# Patient Record
Sex: FEMALE | Race: WHITE | NOT HISPANIC OR LATINO
[De-identification: names, ages, dates, MRNs, and addresses within clinical notes are randomized per-mention and may not be internally consistent; named-entity substitution may affect disease eponyms.]

---

## 2017-03-04 ENCOUNTER — HOSPITAL (OUTPATIENT)
Dept: OTHER | Age: 37
End: 2017-03-04
Attending: SURGERY

## 2017-03-04 LAB
AMORPH SED URNS QL MICRO: ABNORMAL
AMPHETAMINES UR QL SCN>500 NG/ML: NEGATIVE
ANALYZER ANC (IANC): NORMAL
ANION GAP SERPL CALC-SCNC: 10 MMOL/L (ref 10–20)
APPEARANCE UR: CLEAR
APTT PPP: 29 SECONDS (ref 22–30)
APTT PPP: NORMAL S
BACTERIA #/AREA URNS HPF: ABNORMAL /HPF
BARBITURATES UR QL SCN>200 NG/ML: NEGATIVE
BASE DEFICIT BLDA-SCNC: 2 MMOL/L (ref 0–2)
BASE EXCESS BLDA CALC-SCNC: NORMAL MMOL/L
BASOPHILS # BLD: 0.1 THOUSAND/MCL (ref 0–0.3)
BASOPHILS NFR BLD: 1 %
BDY SITE: NORMAL
BENZODIAZ UR QL SCN>200 NG/ML: NEGATIVE
BILIRUB UR QL: NEGATIVE
BODY TEMPERATURE: 37 DEGREES
BUN SERPL-MCNC: 10 MG/DL (ref 6–20)
BUN/CREAT SERPL: 13 (ref 7–25)
BZE UR QL SCN>150 NG/ML: NEGATIVE
CA-I BLD ISE-SCNC: 1.23 MMOL/L (ref 1.15–1.29)
CA-I BLDA-SCNC: 18 % (ref 15–23)
CALCIUM SERPL-MCNC: 8.6 MG/DL (ref 8.4–10.2)
CANNABINOIDS UR QL SCN>50 NG/ML: NEGATIVE
CAOX CRY URNS QL MICRO: ABNORMAL
CHLORIDE BLD-SCNC: 110 MMOL/L (ref 98–107)
CHLORIDE: 103 MMOL/L (ref 98–107)
CK SERPL-CCNC: 90 UNIT/L (ref 26–192)
CO2 SERPL-SCNC: 28 MMOL/L (ref 21–32)
COHGB MFR BLD: 0.4 %
COLOR UR: YELLOW
CONDITION: NORMAL
CONDITION: NORMAL
CREAT SERPL-MCNC: 0.8 MG/DL (ref 0.51–0.95)
DIFFERENTIAL METHOD BLD: NORMAL
EOSINOPHIL # BLD: 0.1 THOUSAND/MCL (ref 0.1–0.5)
EOSINOPHIL NFR BLD: 1 %
EPITH CASTS #/AREA URNS LPF: ABNORMAL /[LPF]
ERYTHROCYTE [DISTWIDTH] IN BLOOD: 14.3 % (ref 11–15)
ETHANOL SERPL-MCNC: NORMAL MG/DL
FATTY CASTS #/AREA URNS LPF: ABNORMAL /[LPF]
GLUCOSE BLD-MCNC: 95 MG/DL (ref 65–99)
GLUCOSE SERPL-MCNC: 75 MG/DL (ref 65–99)
GLUCOSE UR-MCNC: NEGATIVE MG/DL
GRAN CASTS #/AREA URNS LPF: ABNORMAL /[LPF]
HCO3 BLDA-SCNC: 22 MMOL/L (ref 22–28)
HEMATOCRIT: 42.1 % (ref 36–46.5)
HGB BLD-MCNC: 13.2 GM/DL (ref 12–15.5)
HGB BLD-MCNC: 13.6 GM/DL (ref 12–15.5)
HGB UR QL: NEGATIVE
HOROWITZ INDEX BLD+IHG-RTO: 21 %
HYALINE CASTS #/AREA URNS LPF: ABNORMAL /[LPF]
INR PPP: 1.1
KETONES UR-MCNC: NEGATIVE MG/DL
LACTATE BLDA-MCNC: 1 MMOL/L
LEUKOCYTE ESTERASE UR QL STRIP: NEGATIVE
LYMPHOCYTES # BLD: 2.5 THOUSAND/MCL (ref 1–4.8)
LYMPHOCYTES NFR BLD: 29 %
MCH RBC QN AUTO: 27.8 PG (ref 26–34)
MCHC RBC AUTO-ENTMCNC: 32.3 GM/DL (ref 32–36.5)
MCV RBC AUTO: 85.9 FL (ref 78–100)
METHADONE UR QL SCN>300 NG/ML: NEGATIVE NG/ML
METHGB MFR BLD: 0.7 %
MIXED CELL CASTS #/AREA URNS LPF: ABNORMAL /[LPF]
MONOCYTES # BLD: 0.6 THOUSAND/MCL (ref 0.3–0.9)
MONOCYTES NFR BLD: 7 %
MUCOUS THREADS URNS QL MICRO: PRESENT
NEUTROPHILS # BLD: 5.3 THOUSAND/MCL (ref 1.8–7.7)
NEUTROPHILS NFR BLD: 62 %
NEUTS SEG NFR BLD: NORMAL %
NITRITE UR QL: NEGATIVE
OPIATES UR QL SCN>300 NG/ML: POSITIVE
OXYHGB MFR BLD: 96.7 % (ref 94–98)
PCO2 BLDA: 32 MM HG (ref 32–45)
PCP UR QL SCN>25 NG/ML: NEGATIVE
PERCENT NRBC: NORMAL
PH BLDA: 7.44 UNIT (ref 7.35–7.45)
PH UR: 6 UNIT (ref 5–7)
PLATELET # BLD: 294 THOUSAND/MCL (ref 140–450)
PO2 BLDA: 98 MM HG (ref 83–108)
POTASSIUM BLD-SCNC: 4 MMOL/L (ref 3.4–5.1)
POTASSIUM SERPL-SCNC: 4.1 MMOL/L (ref 3.4–5.1)
PROT UR QL: NEGATIVE MG/DL
PROTHROMBIN TIME: 12 SECONDS (ref 9.7–11.8)
PROTHROMBIN TIME: ABNORMAL
RBC # BLD: 4.9 MILLION/MCL (ref 4–5.2)
RBC #/AREA URNS HPF: ABNORMAL /HPF (ref 0–3)
RBC CASTS #/AREA URNS LPF: ABNORMAL /[LPF]
RENAL EPI CELLS #/AREA URNS HPF: ABNORMAL /[HPF]
SAO2 % BLDA: 98 % (ref 95–99)
SODIUM BLD-SCNC: 139 MMOL/L (ref 135–145)
SODIUM SERPL-SCNC: 137 MMOL/L (ref 135–145)
SP GR UR: 1.02 (ref 1–1.03)
SPECIMEN SOURCE: ABNORMAL
SPERM URNS QL MICRO: ABNORMAL
SQUAMOUS #/AREA URNS HPF: ABNORMAL /HPF (ref 0–5)
T VAGINALIS URNS QL MICRO: ABNORMAL
TRI-PHOS CRY URNS QL MICRO: ABNORMAL
TROPONIN I SERPL HS-MCNC: <0.02 NG/ML
URATE CRY URNS QL MICRO: ABNORMAL
URINE REFLEX: ABNORMAL
URNS CMNT MICRO: ABNORMAL
UROBILINOGEN UR QL: 0.2 MG/DL (ref 0–1)
WAXY CASTS #/AREA URNS LPF: ABNORMAL /[LPF]
WBC # BLD: 8.6 THOUSAND/MCL (ref 4.2–11)
WBC #/AREA URNS HPF: ABNORMAL /HPF (ref 0–5)
WBC CASTS #/AREA URNS LPF: ABNORMAL /[LPF]
YEAST HYPHAE URNS QL MICRO: ABNORMAL
YEAST URNS QL MICRO: ABNORMAL

## 2017-03-05 LAB
ANALYZER ANC (IANC): ABNORMAL
ANION GAP SERPL CALC-SCNC: 12 MMOL/L (ref 10–20)
BASOPHILS # BLD: 0.1 THOUSAND/MCL (ref 0–0.3)
BASOPHILS NFR BLD: 1 %
BUN SERPL-MCNC: 13 MG/DL (ref 6–20)
BUN/CREAT SERPL: 17 (ref 7–25)
CALCIUM SERPL-MCNC: 7.7 MG/DL (ref 8.4–10.2)
CHLORIDE: 107 MMOL/L (ref 98–107)
CO2 SERPL-SCNC: 25 MMOL/L (ref 21–32)
CREAT SERPL-MCNC: 0.76 MG/DL (ref 0.51–0.95)
DIFFERENTIAL METHOD BLD: ABNORMAL
EOSINOPHIL # BLD: 0.2 THOUSAND/MCL (ref 0.1–0.5)
EOSINOPHIL NFR BLD: 3 %
ERYTHROCYTE [DISTWIDTH] IN BLOOD: 14.6 % (ref 11–15)
GLUCOSE SERPL-MCNC: 88 MG/DL (ref 65–99)
HEMATOCRIT: 36.4 % (ref 36–46.5)
HGB BLD-MCNC: 11.6 GM/DL (ref 12–15.5)
LYMPHOCYTES # BLD: 2.9 THOUSAND/MCL (ref 1–4.8)
LYMPHOCYTES NFR BLD: 39 %
MCH RBC QN AUTO: 27.8 PG (ref 26–34)
MCHC RBC AUTO-ENTMCNC: 31.9 GM/DL (ref 32–36.5)
MCV RBC AUTO: 87.1 FL (ref 78–100)
MONOCYTES # BLD: 0.4 THOUSAND/MCL (ref 0.3–0.9)
MONOCYTES NFR BLD: 5 %
NEUTROPHILS # BLD: 3.8 THOUSAND/MCL (ref 1.8–7.7)
NEUTROPHILS NFR BLD: 52 %
NEUTS SEG NFR BLD: ABNORMAL %
PERCENT NRBC: ABNORMAL
PLATELET # BLD: 255 THOUSAND/MCL (ref 140–450)
POTASSIUM SERPL-SCNC: 4 MMOL/L (ref 3.4–5.1)
RBC # BLD: 4.18 MILLION/MCL (ref 4–5.2)
SODIUM SERPL-SCNC: 140 MMOL/L (ref 135–145)
WBC # BLD: 7.3 THOUSAND/MCL (ref 4.2–11)

## 2017-03-13 ENCOUNTER — HOSPITAL (OUTPATIENT)
Dept: OTHER | Age: 37
End: 2017-03-13
Attending: SURGERY

## 2019-04-14 ENCOUNTER — HOSPITAL (OUTPATIENT)
Dept: OTHER | Age: 39
End: 2019-04-14
Attending: EMERGENCY MEDICINE

## 2019-04-14 LAB — HCG POINT OF CARE (5HGRST): NEGATIVE

## 2020-12-18 ENCOUNTER — VIRTUAL VISIT (OUTPATIENT)
Dept: FAMILY MEDICINE CLINIC | Age: 40
End: 2020-12-18

## 2020-12-18 RX ORDER — ALBUTEROL SULFATE 90 UG/1
2 AEROSOL, METERED RESPIRATORY (INHALATION)
COMMUNITY

## 2020-12-18 RX ORDER — OLOPATADINE HYDROCHLORIDE 2 MG/ML
SOLUTION/ DROPS OPHTHALMIC
COMMUNITY
Start: 2020-12-01

## 2020-12-18 RX ORDER — METHYLPHENIDATE HYDROCHLORIDE 27 MG/1
18 TABLET, EXTENDED RELEASE ORAL DAILY
COMMUNITY
Start: 2020-11-21

## 2020-12-18 RX ORDER — METOCLOPRAMIDE 5 MG/1
5 TABLET ORAL
COMMUNITY
End: 2021-01-11 | Stop reason: SDUPTHER

## 2020-12-18 RX ORDER — TRAZODONE HYDROCHLORIDE 50 MG/1
TABLET ORAL
COMMUNITY

## 2020-12-18 RX ORDER — CYCLOPENTOLATE HYDROCHLORIDE 10 MG/ML
1 SOLUTION/ DROPS OPHTHALMIC
COMMUNITY

## 2020-12-18 RX ORDER — LURASIDONE HYDROCHLORIDE 60 MG/1
80 TABLET, FILM COATED ORAL
COMMUNITY
Start: 2020-12-16

## 2020-12-18 RX ORDER — GABAPENTIN 300 MG/1
800 CAPSULE ORAL 2 TIMES DAILY
COMMUNITY
Start: 2020-10-24

## 2020-12-18 RX ORDER — IPRATROPIUM BROMIDE 42 UG/1
2 SPRAY, METERED NASAL 4 TIMES DAILY
COMMUNITY

## 2020-12-18 RX ORDER — AMITRIPTYLINE HYDROCHLORIDE 50 MG/1
75 TABLET, FILM COATED ORAL
COMMUNITY
Start: 2020-10-24

## 2020-12-18 RX ORDER — PANTOPRAZOLE SODIUM 40 MG/1
TABLET, DELAYED RELEASE ORAL
COMMUNITY
Start: 2020-12-08 | End: 2021-01-07 | Stop reason: SDUPTHER

## 2020-12-18 RX ORDER — MONTELUKAST SODIUM 10 MG/1
10 TABLET ORAL AT BEDTIME
COMMUNITY

## 2020-12-18 RX ORDER — UMECLIDINIUM 62.5 UG/1
AEROSOL, POWDER ORAL
COMMUNITY
Start: 2020-12-07

## 2020-12-18 RX ORDER — NORETHINDRONE AND ETHINYL ESTRADIOL 1 MG-35MCG
KIT ORAL
COMMUNITY
Start: 2020-12-17

## 2020-12-18 RX ORDER — OLOPATADINE HYDROCHLORIDE 665 UG/1
SPRAY NASAL 2 TIMES DAILY
COMMUNITY

## 2020-12-18 NOTE — PROGRESS NOTES
1. Have you been to the ER, urgent care clinic since your last visit? Hospitalized since your last visit? No    2. Have you seen or consulted any other health care providers outside of the [address] [place] [address] since your last visit? Include any pap smears or colon screening. No       Pharmacy verified. [id number] [address] [facility], [place] [facility] [address] [facility]    Last PCP: Pauly Lott. MD Mark  Phone: [phone]    Patient states does not weight herself due to eating Disorder.     3 most recent PHQ Screens 12/18/2020   Little interest or pleasure in doing things Several days   Feeling down, depressed, irritable, or hopeless Several days   Total Score PHQ 2 2     Health Maintenance Due   Topic Date Due    DTaP/Tdap/Td series (1 - Tdap) 05/05/2000    PAP AKA CERVICAL CYTOLOGY  05/05/2000    Lipid Screen  05/05/2019    Flu Vaccine (1) 09/01/2020     Chief Complaint   Patient presents with   Conception Stair Patient   [address] [facility]

## 2020-12-22 ENCOUNTER — VIRTUAL VISIT (OUTPATIENT)
Dept: FAMILY MEDICINE CLINIC | Age: 40
End: 2020-12-22
Payer: COMMERCIAL

## 2020-12-22 DIAGNOSIS — R79.89 ABNORMAL TSH: ICD-10-CM

## 2020-12-22 DIAGNOSIS — R79.89 ELEVATED FERRITIN: Primary | ICD-10-CM

## 2020-12-22 DIAGNOSIS — K31.84 GASTROPARESIS: ICD-10-CM

## 2020-12-22 DIAGNOSIS — D50.8 OTHER IRON DEFICIENCY ANEMIA: ICD-10-CM

## 2020-12-22 DIAGNOSIS — H55.00 NYSTAGMUS: ICD-10-CM

## 2020-12-22 DIAGNOSIS — R94.5 ABNORMAL LIVER FUNCTION: ICD-10-CM

## 2020-12-22 DIAGNOSIS — R79.89 ELEVATED FERRITIN: ICD-10-CM

## 2020-12-22 DIAGNOSIS — R53.83 LETHARGY: Primary | ICD-10-CM

## 2020-12-22 DIAGNOSIS — J45.40 MODERATE PERSISTENT ASTHMA, UNSPECIFIED WHETHER COMPLICATED: ICD-10-CM

## 2020-12-22 PROCEDURE — 99203 OFFICE O/P NEW LOW 30 MIN: CPT | Performed by: FAMILY MEDICINE

## 2020-12-22 NOTE — PROGRESS NOTES
1. Have you been to the ER, urgent care clinic since your last visit? Hospitalized since your last visit? No    2. Have you seen or consulted any other health care providers outside of the [address] [place] [address] since your last visit? Include any pap smears or colon screening. No      Patient states does not want to be asked her weight due to eating disorder. Labs sent via mail to address on file. Chief Complaint   Patient presents with    New Patient    Establish Care    Labs     3 most recent PHQ Screens 12/22/2020   Little interest or pleasure in doing things Several days   Feeling down, depressed, irritable, or hopeless Several days   Total Score PHQ 2 2     Health Maintenance Due   Topic Date Due    DTaP/Tdap/Td series (1 - Tdap) 05/05/2000    PAP AKA CERVICAL CYTOLOGY  05/05/2000    Lipid Screen  05/05/2019    Flu Vaccine (1) 09/01/2020       Patient-Reported Vitals 12/22/2020   Patient-Reported Weight Patient states does not want to be asked her weight due to eating disorder.

## 2020-12-22 NOTE — PROGRESS NOTES
Abdulaziz Caal is a 38 y.o. female who was seen by synchronous (real-time) audio-video technology on 12/22/2020 for New Patient, Establish Care, and Labs    She reently moved here from [facility] [address]  She was very anemic last year and required transfusions in the past  She is feeling the same symptoms  Fatigue, having pica and wants to check again  She saw hematology but no one could figure out why    She has an h/o anorexia and radha eating disorder  She sees a counselor and dietitian     Assessment & Plan:   Diagnoses and all orders for this visit:    1. Lethargy  -     VITAMIN B12 & FOLATE; Future  -     TSH 3RD GENERATION; Future  -     IRON PROFILE; Future  -     CBC WITH AUTOMATED DIFF; Future  -     METABOLIC PANEL, COMPREHENSIVE; Future  -     FERRITIN; Future    2. Other iron deficiency anemia  -     VITAMIN B12 & FOLATE; Future  -     IRON PROFILE; Future  -     CBC WITH AUTOMATED DIFF; Future  -     METABOLIC PANEL, COMPREHENSIVE; Future  -     FERRITIN; Future    3. Nystagmus  -     REFERRAL TO OPHTHALMOLOGY    4. Gastroparesis  -     REFERRAL TO GASTROENTEROLOGY    5. Moderate persistent asthma, unspecified whether complicated  -     REFERRAL TO ALLERGY            Subjective:       Prior to Admission medications    Medication Sig Start Date End Date Taking? Authorizing Provider   Latuda 60 mg tab tablet  12/16/20  Yes Provider, Historical   Concerta 27 mg CR tablet  11/21/20  Yes Provider, Historical   amitriptyline (ELAVIL) 50 mg tablet  10/24/20  Yes Provider, Historical   gabapentin (NEURONTIN) 300 mg capsule Patient states taking 900 mg daily 10/24/20  Yes Provider, Historical   montelukast (SINGULAIR) 10 mg tablet Take 10 mg by mouth At bedtime. Yes Provider, Historical   metoclopramide HCl (Reglan) 5 mg tablet Take 5 mg by mouth Before breakfast, lunch, and dinner.    Yes Provider, Historical   pantoprazole (PROTONIX) 40 mg tablet  12/8/20  Yes Provider, Historical   albuterol (PROVENTIL HFA, VENTOLIN HFA, PROAIR HFA) 90 mcg/actuation inhaler Take 2 Puffs by inhalation every six (6) hours as needed. Yes Provider, Historical   Incruse Ellipta 62.5 mcg/actuation inhaler  12/7/20  Yes Provider, Historical   olopatadine (PATADAY) 0.2 % drop ophthalmic solution  12/1/20  Yes Provider, Historical   cyclopentolate (CYCLOGYL) 1 % ophthalmic solution Administer 1 Drop to both eyes now. Yes Provider, Historical   ipratropium (ATROVENT) 42 mcg (0.06 %) nasal spray 2 Sprays four (4) times daily. Yes Provider, Historical   Alyacen 1/35, 28, 1-35 mg-mcg tab  12/17/20  Yes Provider, Historical   traZODone (DESYREL) 50 mg tablet Take  by mouth nightly. Yes Provider, Historical   olopatadine (PATANASE) 0.6 % spry two (2) times a day. Yes Provider, Historical     There are no active problems to display for this patient. Current Outpatient Medications   Medication Sig Dispense Refill    Latuda 60 mg tab tablet       Concerta 27 mg CR tablet       amitriptyline (ELAVIL) 50 mg tablet       gabapentin (NEURONTIN) 300 mg capsule Patient states taking 900 mg daily      montelukast (SINGULAIR) 10 mg tablet Take 10 mg by mouth At bedtime.  metoclopramide HCl (Reglan) 5 mg tablet Take 5 mg by mouth Before breakfast, lunch, and dinner.  pantoprazole (PROTONIX) 40 mg tablet       albuterol (PROVENTIL HFA, VENTOLIN HFA, PROAIR HFA) 90 mcg/actuation inhaler Take 2 Puffs by inhalation every six (6) hours as needed.  Incruse Ellipta 62.5 mcg/actuation inhaler       olopatadine (PATADAY) 0.2 % drop ophthalmic solution       cyclopentolate (CYCLOGYL) 1 % ophthalmic solution Administer 1 Drop to both eyes now.  ipratropium (ATROVENT) 42 mcg (0.06 %) nasal spray 2 Sprays four (4) times daily.  Alyacen 1/35, 28, 1-35 mg-mcg tab       traZODone (DESYREL) 50 mg tablet Take  by mouth nightly.  olopatadine (PATANASE) 0.6 % spry two (2) times a day.          ROS    Objective:     Patient-Reported Vitals 12/22/2020   Patient-Reported Weight Patient states does not want to be asked her weight due to eating disorder. [INSTRUCTIONS:  \"[x]\" Indicates a positive item  \"[]\" Indicates a negative item  -- DELETE ALL ITEMS NOT EXAMINED]    Constitutional: [x] Appears well-developed and well-nourished [x] No apparent distress      [] Abnormal -     Mental status: [x] Alert and awake  [x] Oriented to person/place/time [x] Able to follow commands    [] Abnormal -     Eyes:   EOM    [x]  Normal    [] Abnormal -   Sclera  [x]  Normal    [] Abnormal -          Discharge [x]  None visible   [] Abnormal -     HENT: [x] Normocephalic, atraumatic  [] Abnormal -   [x] Mouth/Throat: Mucous membranes are moist    External Ears [x] Normal  [] Abnormal -    Neck: [x] No visualized mass [] Abnormal -     Pulmonary/Chest: [x] Respiratory effort normal   [x] No visualized signs of difficulty breathing or respiratory distress        [] Abnormal -      Musculoskeletal:   [x] Normal gait with no signs of ataxia         [x] Normal range of motion of neck        [] Abnormal -     Neurological:        [x] No Facial Asymmetry (Cranial nerve 7 motor function) (limited exam due to video visit)          [x] No gaze palsy        [] Abnormal -          Skin:        [x] No significant exanthematous lesions or discoloration noted on facial skin         [] Abnormal -            Psychiatric:       [x] Normal Affect [] Abnormal -        [x] No Hallucinations    Other pertinent observable physical exam findings:-        We discussed the expected course, resolution and complications of the diagnosis(es) in detail. Medication risks, benefits, costs, interactions, and alternatives were discussed as indicated. I advised her to contact the office if her condition worsens, changes or fails to improve as anticipated. She expressed understanding with the diagnosis(es) and plan.        Rohit Jones, who was evaluated through a patient-initiated, synchronous (real-time) audio-video encounter, and/or her healthcare decision maker, is aware that it is a billable service, with coverage as determined by her insurance carrier. She provided verbal consent to proceed: Yes, and patient identification was verified. It was conducted pursuant to the emergency declaration under the [address] [place] [address] authority and the [employer] General Act. A caregiver was present when appropriate. Ability to conduct physical exam was limited. I was at home. The patient was at home.       Jenny Dwyer MD

## 2020-12-28 LAB
ALBUMIN SERPL-MCNC: 3.6 G/DL (ref 3.5–5)
ALBUMIN/GLOB SERPL: 1 {RATIO} (ref 1.1–2.2)
ALP SERPL-CCNC: 116 U/L (ref 45–117)
ALT SERPL-CCNC: 74 U/L (ref 12–78)
ANION GAP SERPL CALC-SCNC: 8 MMOL/L (ref 5–15)
AST SERPL-CCNC: 44 U/L (ref 15–37)
BASOPHILS # BLD: 0.1 K/UL (ref 0–0.1)
BASOPHILS NFR BLD: 1 % (ref 0–1)
BILIRUB SERPL-MCNC: 0.3 MG/DL (ref 0.2–1)
BUN SERPL-MCNC: 10 MG/DL (ref 6–20)
BUN/CREAT SERPL: 10 (ref 12–20)
CALCIUM SERPL-MCNC: 9.2 MG/DL (ref 8.5–10.1)
CHLORIDE SERPL-SCNC: 107 MMOL/L (ref 97–108)
CO2 SERPL-SCNC: 22 MMOL/L (ref 21–32)
CREAT SERPL-MCNC: 1.03 MG/DL (ref 0.55–1.02)
DIFFERENTIAL METHOD BLD: NORMAL
EOSINOPHIL # BLD: 0.2 K/UL (ref 0–0.4)
EOSINOPHIL NFR BLD: 2 % (ref 0–7)
ERYTHROCYTE [DISTWIDTH] IN BLOOD BY AUTOMATED COUNT: 13.3 % (ref 11.5–14.5)
FERRITIN SERPL-MCNC: 716 NG/ML (ref 26–388)
FOLATE SERPL-MCNC: 22.1 NG/ML (ref 5–21)
GLOBULIN SER CALC-MCNC: 3.5 G/DL (ref 2–4)
GLUCOSE SERPL-MCNC: 101 MG/DL (ref 65–100)
HCT VFR BLD AUTO: 42.1 % (ref 35–47)
HGB BLD-MCNC: 13.7 G/DL (ref 11.5–16)
IMM GRANULOCYTES # BLD AUTO: 0 K/UL (ref 0–0.04)
IMM GRANULOCYTES NFR BLD AUTO: 0 % (ref 0–0.5)
IRON SATN MFR SERPL: 23 % (ref 20–50)
IRON SERPL-MCNC: 74 UG/DL (ref 35–150)
LYMPHOCYTES # BLD: 1.9 K/UL (ref 0.8–3.5)
LYMPHOCYTES NFR BLD: 27 % (ref 12–49)
MCH RBC QN AUTO: 28.2 PG (ref 26–34)
MCHC RBC AUTO-ENTMCNC: 32.5 G/DL (ref 30–36.5)
MCV RBC AUTO: 86.6 FL (ref 80–99)
MONOCYTES # BLD: 0.3 K/UL (ref 0–1)
MONOCYTES NFR BLD: 5 % (ref 5–13)
NEUTS SEG # BLD: 4.7 K/UL (ref 1.8–8)
NEUTS SEG NFR BLD: 65 % (ref 32–75)
NRBC # BLD: 0 K/UL (ref 0–0.01)
NRBC BLD-RTO: 0 PER 100 WBC
PLATELET # BLD AUTO: 302 K/UL (ref 150–400)
PMV BLD AUTO: 11 FL (ref 8.9–12.9)
POTASSIUM SERPL-SCNC: 4.2 MMOL/L (ref 3.5–5.1)
PROT SERPL-MCNC: 7.1 G/DL (ref 6.4–8.2)
RBC # BLD AUTO: 4.86 M/UL (ref 3.8–5.2)
SODIUM SERPL-SCNC: 137 MMOL/L (ref 136–145)
TIBC SERPL-MCNC: 322 UG/DL (ref 250–450)
TSH SERPL DL<=0.05 MIU/L-ACNC: 4.59 UIU/ML (ref 0.36–3.74)
VIT B12 SERPL-MCNC: 458 PG/ML (ref 193–986)
WBC # BLD AUTO: 7.2 K/UL (ref 3.6–11)

## 2021-01-02 NOTE — PROGRESS NOTES
The thyroid test shows you have low thyroid hormone. Your lethargy is likely coming from hypothyroidism  I want to do some additional tests to confirm this  The blood test showed you have excess iron storage so do not take any iron supplements, I want to repeat that too  I will order the repeat tests.  After you get the blood drawn, I want you to make a virtual visit appointment to go over all of this

## 2021-01-04 ENCOUNTER — TELEPHONE (OUTPATIENT)
Dept: FAMILY MEDICINE CLINIC | Age: 41
End: 2021-01-04

## 2021-01-04 DIAGNOSIS — R79.89 ELEVATED FERRITIN: ICD-10-CM

## 2021-01-04 DIAGNOSIS — R79.89 ABNORMAL TSH: ICD-10-CM

## 2021-01-04 DIAGNOSIS — R94.5 ABNORMAL LIVER FUNCTION: ICD-10-CM

## 2021-01-05 ENCOUNTER — PATIENT MESSAGE (OUTPATIENT)
Dept: FAMILY MEDICINE CLINIC | Age: 41
End: 2021-01-05

## 2021-01-07 RX ORDER — PANTOPRAZOLE SODIUM 40 MG/1
40 TABLET, DELAYED RELEASE ORAL DAILY
Qty: 30 TAB | Refills: 0 | Status: SHIPPED | OUTPATIENT
Start: 2021-01-07 | End: 2021-08-20 | Stop reason: SDUPTHER

## 2021-01-08 NOTE — TELEPHONE ENCOUNTER
From: Bruna Rust  To: Brandi Yoon MD  Sent: 1/5/2021 5:59 PM EST  Subject: Prescription Question    Dr. Darrold Angelucci,  I need a refill on one of my gastro meds. I am scheduled to see Dr. Shanell Banerjee with [facility] but I couldn't get in to see him for a few weeks. In the meantime I am almost out of my Reglan (5mg), which I take for gastro-paresis.   Would you be able to fill this one time before I get connected with my new gastroenterologist?    My [facility] pharmacy is:  [address] [place] [address]   Ph: [phone]      Thanks,  Jim Yanez

## 2021-01-08 NOTE — TELEPHONE ENCOUNTER
reglan is contraindicated with latuda  I did not refill.  I defer to the GI specialist  I did refill the protonix

## 2021-01-11 ENCOUNTER — VIRTUAL VISIT (OUTPATIENT)
Dept: FAMILY MEDICINE CLINIC | Age: 41
End: 2021-01-11
Payer: COMMERCIAL

## 2021-01-11 DIAGNOSIS — K31.84 GASTROPARESIS: Primary | ICD-10-CM

## 2021-01-11 PROCEDURE — 99213 OFFICE O/P EST LOW 20 MIN: CPT | Performed by: FAMILY MEDICINE

## 2021-01-11 RX ORDER — METOCLOPRAMIDE 5 MG/1
5 TABLET ORAL
Qty: 90 TAB | Refills: 0 | Status: SHIPPED | OUTPATIENT
Start: 2021-01-11

## 2021-01-11 NOTE — PROGRESS NOTES
Barbara Zavala is a 38 y.o. female who was seen by synchronous (real-time) audio-video technology on 1/11/2021 for GERD  she had a very low ferritin a year ago   her recent ferritin was high   she eats a lot of red meat  She has not taken iron in a long time  The latuda and reglan have possible neg interaction she has leah taking it for a year and no neg side effects   she has constant reflux without reglan and can't eat  She has a past eating disorder  She will talk to GI about making a change      Assessment & Plan:   Diagnoses and all orders for this visit:    1. Gastroparesis  -     metoclopramide HCl (Reglan) 5 mg tablet; Take 1 Tab by mouth Before breakfast, lunch, and dinner. Subjective:       Prior to Admission medications    Medication Sig Start Date End Date Taking? Authorizing Provider   metoclopramide HCl (Reglan) 5 mg tablet Take 1 Tab by mouth Before breakfast, lunch, and dinner. 1/11/21  Yes Sommer Venegas MD   pantoprazole (PROTONIX) 40 mg tablet Take 1 Tab by mouth daily. 1/7/21   Sommer Venegas MD   Latuda 60 mg tab tablet  12/16/20   Provider, Historical   Concerta 27 mg CR tablet  11/21/20   Provider, Historical   amitriptyline (ELAVIL) 50 mg tablet  10/24/20   Provider, Historical   gabapentin (NEURONTIN) 300 mg capsule Patient states taking 900 mg daily 10/24/20   Provider, Historical   montelukast (SINGULAIR) 10 mg tablet Take 10 mg by mouth At bedtime. Provider, Historical   albuterol (PROVENTIL HFA, VENTOLIN HFA, PROAIR HFA) 90 mcg/actuation inhaler Take 2 Puffs by inhalation every six (6) hours as needed. Provider, Historical   Incruse Ellipta 62.5 mcg/actuation inhaler  12/7/20   Provider, Historical   olopatadine (PATADAY) 0.2 % drop ophthalmic solution  12/1/20   Provider, Historical   cyclopentolate (CYCLOGYL) 1 % ophthalmic solution Administer 1 Drop to both eyes now.     Provider, Historical   ipratropium (ATROVENT) 42 mcg (0.06 %) nasal spray 2 Sprays four (4) times daily.    Provider, Historical   Alyacen 1/35, 28, 1-35 mg-mcg tab  12/17/20   Provider, Historical   traZODone (DESYREL) 50 mg tablet Take  by mouth nightly. Provider, Historical   olopatadine (PATANASE) 0.6 % spry two (2) times a day. Provider, Historical     There are no active problems to display for this patient. Current Outpatient Medications   Medication Sig Dispense Refill    metoclopramide HCl (Reglan) 5 mg tablet Take 1 Tab by mouth Before breakfast, lunch, and dinner. 90 Tab 0    pantoprazole (PROTONIX) 40 mg tablet Take 1 Tab by mouth daily. 30 Tab 0    Latuda 60 mg tab tablet       Concerta 27 mg CR tablet       amitriptyline (ELAVIL) 50 mg tablet       gabapentin (NEURONTIN) 300 mg capsule Patient states taking 900 mg daily      montelukast (SINGULAIR) 10 mg tablet Take 10 mg by mouth At bedtime.  albuterol (PROVENTIL HFA, VENTOLIN HFA, PROAIR HFA) 90 mcg/actuation inhaler Take 2 Puffs by inhalation every six (6) hours as needed.  Incruse Ellipta 62.5 mcg/actuation inhaler       olopatadine (PATADAY) 0.2 % drop ophthalmic solution       cyclopentolate (CYCLOGYL) 1 % ophthalmic solution Administer 1 Drop to both eyes now.  ipratropium (ATROVENT) 42 mcg (0.06 %) nasal spray 2 Sprays four (4) times daily.  Alyacen 1/35, 28, 1-35 mg-mcg tab       traZODone (DESYREL) 50 mg tablet Take  by mouth nightly.  olopatadine (PATANASE) 0.6 % spry two (2) times a day. ROS    Objective:     Patient-Reported Vitals 12/22/2020   Patient-Reported Weight Patient states does not want to be asked her weight due to eating disorder.         [INSTRUCTIONS:  \"[x]\" Indicates a positive item  \"[]\" Indicates a negative item  -- DELETE ALL ITEMS NOT EXAMINED]    Constitutional: [x] Appears well-developed and well-nourished [x] No apparent distress      [] Abnormal -     Mental status: [x] Alert and awake  [x] Oriented to person/place/time [x] Able to follow commands    [] Abnormal -     Eyes:   EOM    [x]  Normal    [] Abnormal -   Sclera  [x]  Normal    [] Abnormal -          Discharge [x]  None visible   [] Abnormal -     HENT: [x] Normocephalic, atraumatic  [] Abnormal -   [x] Mouth/Throat: Mucous membranes are moist    External Ears [x] Normal  [] Abnormal -    Neck: [x] No visualized mass [] Abnormal -     Pulmonary/Chest: [x] Respiratory effort normal   [x] No visualized signs of difficulty breathing or respiratory distress        [] Abnormal -      Musculoskeletal:   [x] Normal gait with no signs of ataxia         [x] Normal range of motion of neck        [] Abnormal -     Neurological:        [x] No Facial Asymmetry (Cranial nerve 7 motor function) (limited exam due to video visit)          [x] No gaze palsy        [] Abnormal -          Skin:        [x] No significant exanthematous lesions or discoloration noted on facial skin         [] Abnormal -            Psychiatric:       [x] Normal Affect [] Abnormal -        [x] No Hallucinations    Other pertinent observable physical exam findings:-        We discussed the expected course, resolution and complications of the diagnosis(es) in detail. Medication risks, benefits, costs, interactions, and alternatives were discussed as indicated. I advised her to contact the office if her condition worsens, changes or fails to improve as anticipated. She expressed understanding with the diagnosis(es) and plan. Arpita Stern, who was evaluated through a patient-initiated, synchronous (real-time) audio-video encounter, and/or her healthcare decision maker, is aware that it is a billable service, with coverage as determined by her insurance carrier. She provided verbal consent to proceed: Yes, and patient identification was verified.  It was conducted pursuant to the emergency declaration under the [facility] [address] authority and the [employer] and [facility] Act. A caregiver was present when appropriate. Ability to conduct physical exam was limited. I was at home. The patient was at home.       Yany Cool MD

## 2021-02-24 DIAGNOSIS — R53.83 LETHARGY: Primary | ICD-10-CM

## 2021-02-24 DIAGNOSIS — R79.89 ELEVATED FERRITIN: ICD-10-CM

## 2021-02-25 ENCOUNTER — VIRTUAL VISIT (OUTPATIENT)
Dept: FAMILY MEDICINE CLINIC | Age: 41
End: 2021-02-25
Payer: COMMERCIAL

## 2021-02-25 DIAGNOSIS — R79.89 ELEVATED FERRITIN: Primary | ICD-10-CM

## 2021-02-25 PROCEDURE — 99213 OFFICE O/P EST LOW 20 MIN: CPT | Performed by: FAMILY MEDICINE

## 2021-02-25 NOTE — PROGRESS NOTES
1. Have you been to the ER, urgent care clinic since your last visit? Hospitalized since your last visit? No    2. Have you seen or consulted any other health care providers outside of the [address] [place] [address] since your last visit? Include any pap smears or colon screening. No     Chief Complaint   Patient presents with    Results     Health Maintenance Due   Topic Date Due    Hepatitis C Screening  1980    DTaP/Tdap/Td series (1 - Tdap) 05/05/2000    PAP AKA CERVICAL CYTOLOGY  05/05/2000    Lipid Screen  05/05/2019    Flu Vaccine (1) 09/01/2020     3 most recent PHQ Screens 2/25/2021   Little interest or pleasure in doing things Several days   Feeling down, depressed, irritable, or hopeless Several days   Total Score PHQ 2 2     Patient refuse weight due to eating Disorder.

## 2021-02-25 NOTE — PROGRESS NOTES
Alexander Gonzales is a 38 y.o. female who was seen by synchronous (real-time) audio-video technology on 2/25/2021 for Results  her ferritin is higher now than it was last month  She has not had a virus recently, she tested neg a few days ago for covid and she had the Antibody  test and that was also neg. She has c/o fatigue and feels like she felt in the past when she was anemic  Her hgb is normal now         Assessment & Plan:   Diagnoses and all orders for this visit:    1. Elevated ferritin    I am referring her to hematology  I gave her the inforation about the referral and that I will be following along and will help her understand things as we sort this out    Recheck in 3 months to see what progress has been made and make referral as needed       Subjective:       Prior to Admission medications    Medication Sig Start Date End Date Taking? Authorizing Provider   metoclopramide HCl (Reglan) 5 mg tablet Take 1 Tab by mouth Before breakfast, lunch, and dinner. 1/11/21  Yes Carlos Juarez MD   pantoprazole (PROTONIX) 40 mg tablet Take 1 Tab by mouth daily. 1/7/21  Yes Carlos Juarez MD   Latuda 60 mg tab tablet 80 mg. 12/16/20  Yes Provider, Historical   Concerta 27 mg CR tablet  11/21/20  Yes Provider, Historical   amitriptyline (ELAVIL) 50 mg tablet  10/24/20  Yes Provider, Historical   gabapentin (NEURONTIN) 300 mg capsule Patient states taking 900 mg daily 10/24/20  Yes Provider, Historical   montelukast (SINGULAIR) 10 mg tablet Take 10 mg by mouth At bedtime. Yes Provider, Historical   albuterol (PROVENTIL HFA, VENTOLIN HFA, PROAIR HFA) 90 mcg/actuation inhaler Take 2 Puffs by inhalation every six (6) hours as needed. Yes Provider, Historical   Incruse Ellipta 62.5 mcg/actuation inhaler  12/7/20  Yes Provider, Historical   olopatadine (PATADAY) 0.2 % drop ophthalmic solution  12/1/20  Yes Provider, Historical   cyclopentolate (CYCLOGYL) 1 % ophthalmic solution Administer 1 Drop to both eyes now.    Yes Provider, Historical   ipratropium (ATROVENT) 42 mcg (0.06 %) nasal spray 2 Sprays four (4) times daily. Yes Provider, Historical   Alyacen 1/35, 28, 1-35 mg-mcg tab  12/17/20  Yes Provider, Historical   traZODone (DESYREL) 50 mg tablet Take  by mouth nightly. Yes Provider, Historical   olopatadine (PATANASE) 0.6 % spry two (2) times a day. Yes Provider, Historical     There are no active problems to display for this patient. Current Outpatient Medications   Medication Sig Dispense Refill    metoclopramide HCl (Reglan) 5 mg tablet Take 1 Tab by mouth Before breakfast, lunch, and dinner. 90 Tab 0    pantoprazole (PROTONIX) 40 mg tablet Take 1 Tab by mouth daily. 30 Tab 0    Latuda 60 mg tab tablet 80 mg.      Concerta 27 mg CR tablet       amitriptyline (ELAVIL) 50 mg tablet       gabapentin (NEURONTIN) 300 mg capsule Patient states taking 900 mg daily      montelukast (SINGULAIR) 10 mg tablet Take 10 mg by mouth At bedtime.  albuterol (PROVENTIL HFA, VENTOLIN HFA, PROAIR HFA) 90 mcg/actuation inhaler Take 2 Puffs by inhalation every six (6) hours as needed.  Incruse Ellipta 62.5 mcg/actuation inhaler       olopatadine (PATADAY) 0.2 % drop ophthalmic solution       cyclopentolate (CYCLOGYL) 1 % ophthalmic solution Administer 1 Drop to both eyes now.  ipratropium (ATROVENT) 42 mcg (0.06 %) nasal spray 2 Sprays four (4) times daily.  Alyacen 1/35, 28, 1-35 mg-mcg tab       traZODone (DESYREL) 50 mg tablet Take  by mouth nightly.  olopatadine (PATANASE) 0.6 % spry two (2) times a day. ROS    Objective:     Patient-Reported Vitals 12/22/2020   Patient-Reported Weight Patient states does not want to be asked her weight due to eating disorder.         [INSTRUCTIONS:  \"[x]\" Indicates a positive item  \"[]\" Indicates a negative item  -- DELETE ALL ITEMS NOT EXAMINED]    Constitutional: [x] Appears well-developed and well-nourished [x] No apparent distress      [] Abnormal - Mental status: [x] Alert and awake  [x] Oriented to person/place/time [x] Able to follow commands    [] Abnormal -     Eyes:   EOM    [x]  Normal    [] Abnormal -   Sclera  [x]  Normal    [] Abnormal -          Discharge [x]  None visible   [] Abnormal -     HENT: [x] Normocephalic, atraumatic  [] Abnormal -   [x] Mouth/Throat: Mucous membranes are moist    External Ears [x] Normal  [] Abnormal -    Neck: [x] No visualized mass [] Abnormal -     Pulmonary/Chest: [x] Respiratory effort normal   [x] No visualized signs of difficulty breathing or respiratory distress        [] Abnormal -      Musculoskeletal:   [x] Normal gait with no signs of ataxia         [x] Normal range of motion of neck        [] Abnormal -     Neurological:        [x] No Facial Asymmetry (Cranial nerve 7 motor function) (limited exam due to video visit)          [x] No gaze palsy        [] Abnormal -          Skin:        [x] No significant exanthematous lesions or discoloration noted on facial skin         [] Abnormal -            Psychiatric:       [x] Normal Affect [] Abnormal -        [x] No Hallucinations    Other pertinent observable physical exam findings:-        We discussed the expected course, resolution and complications of the diagnosis(es) in detail. Medication risks, benefits, costs, interactions, and alternatives were discussed as indicated. I advised her to contact the office if her condition worsens, changes or fails to improve as anticipated. She expressed understanding with the diagnosis(es) and plan. Elisabet Levin, who was evaluated through a patient-initiated, synchronous (real-time) audio-video encounter, and/or her healthcare decision maker, is aware that it is a billable service, with coverage as determined by her insurance carrier. She provided verbal consent to proceed: Yes, and patient identification was verified.  It was conducted pursuant to the emergency declaration under the [address] and the National Emergencies Act, [facility] waiver authority and the [employer] and [employer] Act. A caregiver was present when appropriate. Ability to conduct physical exam was limited. I was at home. The patient was at home.       Diallo Dozier MD

## 2021-03-18 ENCOUNTER — TELEPHONE (OUTPATIENT)
Dept: FAMILY MEDICINE CLINIC | Age: 41
End: 2021-03-18

## 2021-03-18 NOTE — TELEPHONE ENCOUNTER
Pt will have to hold to speak with a live person. They are currently dealing with a high call volume. Please give pt Billing number of [phone]      ----- Message from Vania Wu sent at 3/18/2021  1:20 PM EDT -----  Regarding: Raisa Eisenberg for 02/25/21  Contact: [phone]  General Message/Vendor Calls    Caller's first and last name: NA       Reason for call: Requesting to speak with billing for visit on 02/25/21. Callback required yes/no and why: Yes, discussion of billing statement for 02/25/21. Best contact number(s):[phone]      Details to clarify the request:  Patient advising that she received a bill from 02/25/21 visit and claims insurance was billed to old insurance plan. Please have billing contact patient to discuss as she advised that she has tried numerous times to get a live person to update this so insurance was billed correctly and bill may be paid to correct plan, but no answer from representative after holding for 25+ mins. Patient frustrated and wants to discuss with office as soon as possible.       Vania Wu

## 2021-03-22 ENCOUNTER — VIRTUAL VISIT (OUTPATIENT)
Dept: FAMILY MEDICINE CLINIC | Age: 41
End: 2021-03-22
Payer: COMMERCIAL

## 2021-03-22 DIAGNOSIS — G47.8 UNREFRESHED BY SLEEP: Primary | ICD-10-CM

## 2021-03-22 DIAGNOSIS — R40.0 DAYTIME SOMNOLENCE: ICD-10-CM

## 2021-03-22 PROCEDURE — 99213 OFFICE O/P EST LOW 20 MIN: CPT | Performed by: FAMILY MEDICINE

## 2021-03-22 RX ORDER — MELATONIN 10 MG
10 CAPSULE ORAL
COMMUNITY
Start: 2021-02-19

## 2021-03-22 NOTE — PROGRESS NOTES
Alfredo Guillen is a 38 y.o. female who was seen by synchronous (real-time) audio-video technology on 3/22/2021 for Sleep Problem and Follow-up  she is seeing a counselor and the sleep counselor said talk to me about getting a sleep study  She is unrefreshed by sleep and has daytime somnolence  She is in the bed 10 hrs and wakes up unrefreshed  She has never been told  No report of breath holding but wakes  Up coughing  On the weekends she has alcohol , this is not a daily thing          Assessment & Plan:   Diagnoses and all orders for this visit:    1. Unrefreshed by sleep  -     [address] [place] [address]; Future    2. Daytime somnolence  -     SLEEP MEDICINE REFERRAL; Future    has signs and symptoms of SUNITHA  Referring for testing        Subjective:       Prior to Admission medications    Medication Sig Start Date End Date Taking? Authorizing Provider   melatonin 10 mg capsule Take 50 mg by mouth. 2/19/21  Yes Provider, Historical   metoclopramide HCl (Reglan) 5 mg tablet Take 1 Tab by mouth Before breakfast, lunch, and dinner. 1/11/21  Yes Sundar Vang MD   pantoprazole (PROTONIX) 40 mg tablet Take 1 Tab by mouth daily. 1/7/21  Yes Sundar Vang MD   Latuda 60 mg tab tablet 80 mg. 12/16/20  Yes Provider, Historical   Concerta 27 mg CR tablet  11/21/20  Yes Provider, Historical   amitriptyline (ELAVIL) 50 mg tablet  10/24/20  Yes Provider, Historical   gabapentin (NEURONTIN) 300 mg capsule Patient states taking 900 mg daily 10/24/20  Yes Provider, Historical   montelukast (SINGULAIR) 10 mg tablet Take 10 mg by mouth At bedtime. Yes Provider, Historical   albuterol (PROVENTIL HFA, VENTOLIN HFA, PROAIR HFA) 90 mcg/actuation inhaler Take 2 Puffs by inhalation every six (6) hours as needed.    Yes Provider, Historical   Incruse Ellipta 62.5 mcg/actuation inhaler  12/7/20  Yes Provider, Historical   olopatadine (PATADAY) 0.2 % drop ophthalmic solution  12/1/20  Yes Provider, Historical   cyclopentolate (CYCLOGYL) 1 % ophthalmic solution Administer 1 Drop to both eyes now. Yes Provider, Historical   ipratropium (ATROVENT) 42 mcg (0.06 %) nasal spray 2 Sprays four (4) times daily. Yes Provider, Historical   Alyacen 1/35, 28, 1-35 mg-mcg tab  12/17/20  Yes Provider, Historical   traZODone (DESYREL) 50 mg tablet Take  by mouth nightly. Yes Provider, Historical   olopatadine (PATANASE) 0.6 % spry two (2) times a day. Yes Provider, Historical     There are no active problems to display for this patient. Current Outpatient Medications   Medication Sig Dispense Refill    melatonin 10 mg capsule Take 50 mg by mouth.  metoclopramide HCl (Reglan) 5 mg tablet Take 1 Tab by mouth Before breakfast, lunch, and dinner. 90 Tab 0    pantoprazole (PROTONIX) 40 mg tablet Take 1 Tab by mouth daily. 30 Tab 0    Latuda 60 mg tab tablet 80 mg.      Concerta 27 mg CR tablet       amitriptyline (ELAVIL) 50 mg tablet       gabapentin (NEURONTIN) 300 mg capsule Patient states taking 900 mg daily      montelukast (SINGULAIR) 10 mg tablet Take 10 mg by mouth At bedtime.  albuterol (PROVENTIL HFA, VENTOLIN HFA, PROAIR HFA) 90 mcg/actuation inhaler Take 2 Puffs by inhalation every six (6) hours as needed.  Incruse Ellipta 62.5 mcg/actuation inhaler       olopatadine (PATADAY) 0.2 % drop ophthalmic solution       cyclopentolate (CYCLOGYL) 1 % ophthalmic solution Administer 1 Drop to both eyes now.  ipratropium (ATROVENT) 42 mcg (0.06 %) nasal spray 2 Sprays four (4) times daily.  Alyacen 1/35, 28, 1-35 mg-mcg tab       traZODone (DESYREL) 50 mg tablet Take  by mouth nightly.  olopatadine (PATANASE) 0.6 % spry two (2) times a day. ROS    Objective:     Patient-Reported Vitals 12/22/2020   Patient-Reported Weight Patient states does not want to be asked her weight due to eating disorder.         [INSTRUCTIONS:  \"[x]\" Indicates a positive item  \"[]\" Indicates a negative item  -- DELETE ALL ITEMS NOT EXAMINED]    Constitutional: [x] Appears well-developed and well-nourished [x] No apparent distress      [] Abnormal -     Mental status: [x] Alert and awake  [x] Oriented to person/place/time [x] Able to follow commands    [] Abnormal -     Eyes:   EOM    [x]  Normal    [] Abnormal -   Sclera  [x]  Normal    [] Abnormal -          Discharge [x]  None visible   [] Abnormal -     HENT: [x] Normocephalic, atraumatic  [] Abnormal -   [x] Mouth/Throat: Mucous membranes are moist    External Ears [x] Normal  [] Abnormal -    Neck: [x] No visualized mass [] Abnormal -     Pulmonary/Chest: [x] Respiratory effort normal   [x] No visualized signs of difficulty breathing or respiratory distress        [] Abnormal -      Musculoskeletal:   [x] Normal gait with no signs of ataxia         [x] Normal range of motion of neck        [] Abnormal -     Neurological:        [x] No Facial Asymmetry (Cranial nerve 7 motor function) (limited exam due to video visit)          [x] No gaze palsy        [] Abnormal -          Skin:        [x] No significant exanthematous lesions or discoloration noted on facial skin         [] Abnormal -            Psychiatric:       [x] Normal Affect [] Abnormal -        [x] No Hallucinations    Other pertinent observable physical exam findings:-        We discussed the expected course, resolution and complications of the diagnosis(es) in detail. Medication risks, benefits, costs, interactions, and alternatives were discussed as indicated. I advised her to contact the office if her condition worsens, changes or fails to improve as anticipated. She expressed understanding with the diagnosis(es) and plan. Edil Odonnell, was evaluated through a synchronous (real-time) audio-video encounter. The patient (or guardian if applicable) is aware that this is a billable service. Verbal consent to proceed has been obtained within the past 12 months.  The visit was conducted pursuant to the emergency declaration under the [facility], [address] [place] [address] waiver authority and the [employer] General Act. Patient identification was verified, and a caregiver was present when appropriate. The patient was located in a state where the provider was credentialed to provide care.       Kimberly Jade MD

## 2021-03-22 NOTE — PROGRESS NOTES
1. Have you been to the ER, urgent care clinic since your last visit? Hospitalized since your last visit? No    2. Have you seen or consulted any other health care providers outside of the [address] [place] [address] since your last visit? Include any pap smears or colon screening. No     Chief Complaint   Patient presents with    Sleep Problem    Follow-up     Patient-Reported Vitals 12/22/2020   Patient-Reported Weight Patient states does not want to be asked her weight due to eating disorder.       3 most recent PHQ Screens 3/22/2021   Little interest or pleasure in doing things Several days   Feeling down, depressed, irritable, or hopeless Several days   Total Score PHQ 2 2     Health Maintenance Due   Topic Date Due    Hepatitis C Screening  Never done    DTaP/Tdap/Td series (1 - Tdap) Never done    PAP AKA CERVICAL CYTOLOGY  Never done    Lipid Screen  Never done    Flu Vaccine (1) 09/01/2020

## 2021-03-31 ENCOUNTER — OFFICE VISIT (OUTPATIENT)
Dept: SLEEP MEDICINE | Age: 41
End: 2021-03-31
Payer: COMMERCIAL

## 2021-03-31 VITALS
OXYGEN SATURATION: 95 % | HEART RATE: 108 BPM | TEMPERATURE: 97 F | SYSTOLIC BLOOD PRESSURE: 117 MMHG | HEIGHT: 66 IN | WEIGHT: 207.6 LBS | BODY MASS INDEX: 33.37 KG/M2 | DIASTOLIC BLOOD PRESSURE: 87 MMHG

## 2021-03-31 DIAGNOSIS — G47.33 OSA (OBSTRUCTIVE SLEEP APNEA): Primary | ICD-10-CM

## 2021-03-31 DIAGNOSIS — E66.9 OBESITY (BMI 30.0-34.9): ICD-10-CM

## 2021-03-31 PROCEDURE — 99204 OFFICE O/P NEW MOD 45 MIN: CPT | Performed by: SPECIALIST

## 2021-03-31 NOTE — PATIENT INSTRUCTIONS
Learning About Sleep Apnea What is it? Sleep apnea means that breathing stops for short periods during sleep. When you stop breathing or have reduced airflow into your lungs during sleep, you don't sleep well and you can be very tired during the day. The oxygen levels in your blood may go down, and carbon dioxide levels go up. It may lead to other problems, such as high blood pressure and heart disease. Sleep apnea can range from mild to severe, based on how often breathing stops during sleep. Breathing may stop as few as 5 times an hour (mild apnea) to 30 or more times an hour (severe apnea). Obstructive sleep apnea is the most common type. This most often occurs because your airways are blocked or partly blocked. Central sleep apnea is less common. It happens when the brain has trouble controlling breathing. Some people have both types. That's called complex sleep apnea. What are the symptoms? There are symptoms of sleep apnea that you may notice and symptoms that others may notice when you're asleep. Symptoms you may notice include: · Feeling extremely sleepy during the day. · Feeling unrefreshed or tired after a night's sleep. · Problems with memory and concentration, or mood changes. · Morning or night headaches. · Heartburn or a sour taste in your mouth at night. · Swelling of the legs. · Getting up often during the night to urinate. · A dry mouth or sore throat in the morning. Your bed partner may notice that you: 
· Have episodes of not breathing. · Snore loudly. Almost all people who have sleep apnea snore. But not all people who snore have sleep apnea. · Toss and turn during sleep. · Have nighttime choking or gasping spells. How is it diagnosed? Your doctor will probably do a physical exam and ask about your past health. He or she may also ask you or your bed partner about your snoring and sleep behavior and how tired you feel during the day.  
Your doctor may suggest a sleep study. Sleep studies are a series of tests that look at what happens to the body during sleep. They check for how often you stop breathing or have too little air flowing into your lungs during sleep. They also find out how much oxygen you have in your blood during sleep. A sleep study may take place in your home. Or it might take place at a sleep center, where you will spend the night. How is it treated? Sleep apnea is often treated with devices that deliver air through a mask to help keep your airways open. These include: 
· Continuous positive airway pressure (CPAP). This increases air pressure in your throat. It keeps your airway open when you breathe in. It's the most common device. · Bilevel positive airway pressure (BiPAP). This uses different air pressures when you breathe in and out. · Adaptive servo ventilation (ASV). It senses pauses in breathing and adjusts air pressure. It's mostly used for central sleep apnea. If your tonsils or other tissues are blocking your airway, your doctor may suggest surgery to open the airway. How can you care for yourself? You may be able to treat mild sleep apnea by making changes in how you live and the way you sleep. For example, it may help to: 
· Lose weight if you are overweight. · Sleep on your side, not your back. · Avoid alcohol and medicines such as sedatives before bed. You may also try an oral breathing device. It helps keep your airways open while you sleep. Where can you learn more? Go to http://www.gray.com/ Enter S121 in the search box to learn more about \"Learning About Sleep Apnea. \" Current as of: February 24, 2020               Content Version: 12.6 © 5366-1713 [employer]. Care instructions adapted under license by [employer] (which disclaims liability or warranty for this information).  If you have questions about a medical condition or this instruction, always ask your healthcare professional. [employer] disclaims any warranty or liability for your use of this information.

## 2021-04-08 ENCOUNTER — OFFICE VISIT (OUTPATIENT)
Dept: SLEEP MEDICINE | Age: 41
End: 2021-04-08

## 2021-04-08 ENCOUNTER — HOSPITAL ENCOUNTER (OUTPATIENT)
Dept: SLEEP MEDICINE | Age: 41
Discharge: HOME | End: 2021-04-08
Payer: COMMERCIAL

## 2021-04-08 DIAGNOSIS — G47.33 OSA (OBSTRUCTIVE SLEEP APNEA): Primary | ICD-10-CM

## 2021-04-08 PROCEDURE — 95806 SLEEP STUDY UNATT&RESP EFFT: CPT | Performed by: SPECIALIST

## 2021-04-08 NOTE — PROGRESS NOTES
[address] [facility]. [place], [address]  Tel.  [phone]  Fax. [address] [facility] [address]  [place], [address] [facility]  Tel.  [phone]  Fax. [phone] [address] [facility] Nathalia Wilder   Tel.  [phone]  Fax. [phone]       S>Sissy Gray is a 38 y.o. female seen today to receive a home sleep testing unit (HST). · Patient was educated on proper hookup and operation of the HST. · Instruction forms and documentation were reviewed and signed. · The patient demonstrated good understanding of the HST.    O>    There were no vitals taken for this visit. A>  No diagnosis found. P>  · General information regarding operations and maintenance of the device was provided. · She was provided information on sleep apnea including coresponding risk factors and the importance of proper treatment. · Follow-up appointment was made to return the HST. She will be contacted once the results have been reviewed. · She was asked to contact our office for any problems regarding her home sleep test study.   · HST SN # [id number]

## 2021-04-09 ENCOUNTER — OFFICE VISIT (OUTPATIENT)
Dept: SLEEP MEDICINE | Age: 41
End: 2021-04-09

## 2021-04-09 DIAGNOSIS — G47.33 OSA (OBSTRUCTIVE SLEEP APNEA): Primary | ICD-10-CM

## 2021-04-12 ENCOUNTER — TELEPHONE (OUTPATIENT)
Dept: ONCOLOGY | Age: 41
End: 2021-04-12

## 2021-04-13 ENCOUNTER — VIRTUAL VISIT (OUTPATIENT)
Dept: ONCOLOGY | Age: 41
End: 2021-04-13
Payer: COMMERCIAL

## 2021-04-13 DIAGNOSIS — R53.82 CHRONIC FATIGUE: ICD-10-CM

## 2021-04-13 DIAGNOSIS — R79.89 HIGH SERUM FERRITIN: Primary | ICD-10-CM

## 2021-04-13 PROBLEM — F50.9 EATING DISORDER IN REMISSION: Status: ACTIVE | Noted: 2021-04-13

## 2021-04-13 PROBLEM — F32.A DEPRESSIVE DISORDER: Status: ACTIVE | Noted: 2020-07-14

## 2021-04-13 PROCEDURE — 99204 OFFICE O/P NEW MOD 45 MIN: CPT | Performed by: INTERNAL MEDICINE

## 2021-04-13 NOTE — PROGRESS NOTES
Chief Complaint   Patient presents with    New Patient     Ortega Boothe is a pleasant 38year old woman who presents as a new patient for high ferritin.  She denies pain

## 2021-04-13 NOTE — PROGRESS NOTES
[address] [facility] [place]  [phone]    Hematology / Oncology Consult    Reason for Visit:   Pallavi Gomez is a 38 y.o. female who is seen by synchronous (real-time) audio-video technology on 4/13/2021 in consultation at the request of Dr. Sherman Marino for evaluation of high ferritin. History of Present Illness:   Pallavi Gomez is a 38 y.o. female who is referred for elevated ferritin. Based on review of PCP notes, pt moved here from [facility] and established care in late 2020. Pt states her ferritin was low between 5-15. She takes OCPs continuously to avoid her menstrual periods and therefore, blood loss was not attributed to menses. She underwent EGD, colonoscopy which was reportedly normal approx 2017. No h/o gastric bypass. Dec 2019 - received iron infusion and afterwards ferritin was in 400 range. Has gastroparesis. Given a history of prior iron deficiency anemia in the past, patient wanted her CBC and iron profile, ferritin checked. Ferritin has been persistently elevated in 600 - 700 range between 12/2020 and 3/2021. She is not on any iron supplements. Of note, pt has eating disorder. EtOH intake only on the weekends. [place] ancestry. No past medical history on file. No past surgical history on file. Social History     Tobacco Use    Smoking status: Never Smoker    Smokeless tobacco: Never Used   Substance Use Topics    Alcohol use: Yes     Comment: weekends      No family history on file. Current Outpatient Medications   Medication Sig    melatonin 10 mg capsule Take 50 mg by mouth.  metoclopramide HCl (Reglan) 5 mg tablet Take 1 Tab by mouth Before breakfast, lunch, and dinner.  pantoprazole (PROTONIX) 40 mg tablet Take 1 Tab by mouth daily.     Latuda 60 mg tab tablet 80 mg.    Concerta 27 mg CR tablet     amitriptyline (ELAVIL) 50 mg tablet     gabapentin (NEURONTIN) 300 mg capsule Patient states taking 900 mg daily    montelukast (SINGULAIR) 10 mg tablet Take 10 mg by mouth At bedtime.  albuterol (PROVENTIL HFA, VENTOLIN HFA, PROAIR HFA) 90 mcg/actuation inhaler Take 2 Puffs by inhalation every six (6) hours as needed.  Incruse Ellipta 62.5 mcg/actuation inhaler     olopatadine (PATADAY) 0.2 % drop ophthalmic solution     cyclopentolate (CYCLOGYL) 1 % ophthalmic solution Administer 1 Drop to both eyes now.  ipratropium (ATROVENT) 42 mcg (0.06 %) nasal spray 2 Sprays four (4) times daily.  Alyacen 1/35, 28, 1-35 mg-mcg tab     traZODone (DESYREL) 50 mg tablet Take  by mouth nightly.  olopatadine (PATANASE) 0.6 % spry two (2) times a day. No current facility-administered medications for this visit. No Known Allergies     Review of Systems: A complete review of systems was obtained, negative except as described above. Physical Exam:     Due to this being a TeleHealth evaluation, many elements of the physical examination are unable to be assessed. Constitutional: Appears well-developed and well-nourished in no apparent distress   Mental status: Alert and awake, Oriented to person/place/time, Able to follow commands  Eyes: EOM normal, Sclera normal, No visible ocular discharge  HENT: Normocephalic, atraumatic; Mouth/Throat: Moist mucous membranes, External Ears normal  Neck: No visualized mass  Pulmonary/Chest: Respiratory effort normal, No visualized signs of difficulty breathing or respiratory distress   Musculoskeletal: Normal gait with no signs of ataxia, Normal range of motion of neck  Neurological: No facial asymmetry (Cranial nerve 7 motor function), No gaze palsy  Skin: No significant exanthematous lesions or discoloration noted on facial skin  Psychiatric: Normal affect, normal judgment/insight.  No hallucinations     Results:     Lab Results   Component Value Date/Time    WBC 7.2 02/10/2021 11:02 AM    HGB 13.9 02/10/2021 11:02 AM    HCT 43.2 02/10/2021 11:02 AM    PLATELET 546 24/48/8349 11:02 AM    MCV 89.4 02/10/2021 11:02 AM    ABS. NEUTROPHILS 4.8 02/10/2021 11:02 AM     Lab Results   Component Value Date/Time    Sodium 138 01/05/2021 02:41 PM    Potassium 4.4 01/05/2021 02:41 PM    Chloride 107 01/05/2021 02:41 PM    CO2 23 01/05/2021 02:41 PM    Glucose 78 01/05/2021 02:41 PM    BUN 9 01/05/2021 02:41 PM    Creatinine 0.97 01/05/2021 02:41 PM    GFR est AA >60 01/05/2021 02:41 PM    GFR est non-AA >60 01/05/2021 02:41 PM    Calcium 9.2 01/05/2021 02:41 PM     Lab Results   Component Value Date/Time    Bilirubin, total 0.3 01/05/2021 02:41 PM    ALT (SGPT) 56 01/05/2021 02:41 PM    Alk. phosphatase 114 01/05/2021 02:41 PM    Protein, total 7.3 01/05/2021 02:41 PM    Albumin 3.8 01/05/2021 02:41 PM    Globulin 3.5 01/05/2021 02:41 PM     Lab Results   Component Value Date/Time    Iron 92 02/10/2021 11:02 AM    TIBC 324 02/10/2021 11:02 AM    Iron % saturation 28 02/10/2021 11:02 AM    Ferritin 741 (H) 02/10/2021 11:02 AM       Lab Results   Component Value Date/Time    Vitamin B12 350 01/05/2021 02:41 PM    Folate 27.9 (H) 01/05/2021 02:41 PM     Lab Results   Component Value Date/Time    TSH 2.84 01/05/2021 02:41 PM     No results found for: HAMAT, HAAB, HABT, HAAT, HBSAG, HBSB, HBSAT, HBABN, HBCM, HBCAB, HBCAT, Theodor Batters, HBEAB, HBEAG, XHEPCS, [id number], [address] [facility], [facility], HBCLT, [facility], [id number], [id number], [address] [place] [address], [id number], HBCMLT, [id number], HCGAT      Imaging:     Radiology report(s) reviewed    Assessment & Plan:   Sue Espinoza is a 38 y.o. female with prior history of iron deficiency and now with elevated ferritin. 1. Elevated ferritin:  No evidence of anemia or iron deficiency currently. Iron sat normal (not elevated) and no transaminitis. Since ferritin can act as an acute phase reactant, it is possible the elevated ferritin is due to inflammation.  Although iron infusions can cause iatrogenic hyperferritenemia, the timing of the last iron infusions in 2019 does not necessarily fit with current elevated ferritin in 2021. No significant EtOH intake, known liver disease or known autoimmune disorder. I recommend testing for hemochromatosis, but overall low suspicion given normal iron saturation. If HFE analysis is negative for hemochromatosis, it is reasonable to continue monitoring ferritin value 2-3 times a year as this is a nonspecific finding.  -- HFE analysis - call pt with results. -- Follow up as needed. 2. Fatigue:  Likely related to SUNITHA. No anemia. Pt is unsure whether this is related to her anxiety and depression or her medications which can be sedating. I appreciate the opportunity to participate in Ms. Leeanne Howe care. Total physician time spent on this encounter was 60 minutes, reviewing patient provided history, labs in our system, prior CBCs in [address] [place] [address] from [employer], formulating assessment & plan, discussing with patient, ordering labs and documentation of this encounter. The patient was evaluated through a synchronous (real-time) audio-video encounter. The patient (or guardian if applicable) is aware that this is a billable service. Verbal consent to proceed has been obtained within the past 12 months. The visit was conducted pursuant to the emergency declaration under the [facility], [address] [place] [address] waiver authority and the [employer] General Act. Patient identification was verified, and a caregiver was present when appropriate. The patient was located in a state where the provider was credentialed to provide care.       Signed By: Carito Duran MD     April 13, 2021

## 2021-04-26 ENCOUNTER — TELEPHONE (OUTPATIENT)
Dept: SLEEP MEDICINE | Age: 41
End: 2021-04-26

## 2021-04-26 NOTE — TELEPHONE ENCOUNTER
HSAT performed for potential sleep disordered breathing. Study demonstrated normal AHI 1.3/h associated with minimal SaO2 of 88%. Snoring during 1.8% of the recording. Sleep technologist: Please advise patient of HSAT results. Consider repeating study if symptoms become more prominent.

## 2021-04-26 NOTE — TELEPHONE ENCOUNTER
Reviewed sleep study results with patient. She expressed understanding. Patient requests to schedule follow-up with Dr. Christian Devine to discuss continued fatigue issues. We will schedule at earliest availability.

## 2021-05-11 ENCOUNTER — OFFICE VISIT (OUTPATIENT)
Dept: SLEEP MEDICINE | Age: 41
End: 2021-05-11
Payer: COMMERCIAL

## 2021-05-11 VITALS
DIASTOLIC BLOOD PRESSURE: 92 MMHG | SYSTOLIC BLOOD PRESSURE: 120 MMHG | TEMPERATURE: 98.6 F | BODY MASS INDEX: 33.27 KG/M2 | OXYGEN SATURATION: 96 % | HEART RATE: 98 BPM | HEIGHT: 66 IN | WEIGHT: 207 LBS

## 2021-05-11 DIAGNOSIS — G47.8 NON-RESTORATIVE SLEEP: ICD-10-CM

## 2021-05-11 DIAGNOSIS — G47.33 OSA (OBSTRUCTIVE SLEEP APNEA): Primary | ICD-10-CM

## 2021-05-11 PROCEDURE — 99212 OFFICE O/P EST SF 10 MIN: CPT | Performed by: SPECIALIST

## 2021-05-11 NOTE — PROGRESS NOTES
[address] Carlos Manuel. [place], [address]  Tel.  [phone]  Fax. [address] [facility] 60  [place], [address] [facility]  Tel.  [phone]  Fax. [phone] [address] [facility] [place], [facility]  Tel.  [phone]  Fax. [phone]         Chief Complaint       Chief Complaint   Patient presents with    Sleep Problem     result review         HPI        Anat Zhao is a 42 y.o. female seen for follow-up. She has history of snoring and daytime fatigue. Fatigue has been more prominent during the past 1-2 years. She was evaluated with a home sleep test. Study demonstrated normal AHI 1.3/h associated with minimal SaO2 of 88%. Snoring during 1.8% of the recording. HSAT reviewed in detail with her today. She continues with significant fatigue on awakening; limits her ability to function normally . No Known Allergies    Current Outpatient Medications   Medication Sig Dispense Refill    melatonin 10 mg capsule Take 50 mg by mouth.  metoclopramide HCl (Reglan) 5 mg tablet Take 1 Tab by mouth Before breakfast, lunch, and dinner. 90 Tab 0    pantoprazole (PROTONIX) 40 mg tablet Take 1 Tab by mouth daily. 30 Tab 0    Latuda 60 mg tab tablet 80 mg.      Concerta 27 mg CR tablet       amitriptyline (ELAVIL) 50 mg tablet       gabapentin (NEURONTIN) 300 mg capsule Patient states taking 900 mg daily      montelukast (SINGULAIR) 10 mg tablet Take 10 mg by mouth At bedtime.  albuterol (PROVENTIL HFA, VENTOLIN HFA, PROAIR HFA) 90 mcg/actuation inhaler Take 2 Puffs by inhalation every six (6) hours as needed.  olopatadine (PATADAY) 0.2 % drop ophthalmic solution       cyclopentolate (CYCLOGYL) 1 % ophthalmic solution Administer 1 Drop to both eyes now.  ipratropium (ATROVENT) 42 mcg (0.06 %) nasal spray 2 Sprays four (4) times daily.       Alyacen 1/35, 28, 1-35 mg-mcg tab       Incruse Ellipta 62.5 mcg/actuation inhaler       traZODone (DESYREL) 50 mg tablet Take  by mouth nightly.  olopatadine (PATANASE) 0.6 % spry two (2) times a day. She  has no past medical history on file. She  has no past surgical history on file. She family history is not on file. She  reports that she has never smoked. She has never used smokeless tobacco. She reports current alcohol use. She reports that she does not use drugs. Review of Systems:  Unchanged per patient      Objective:     Visit Vitals  BP (!) 120/92 (BP 1 Location: Left upper arm, BP Patient Position: Sitting, BP Cuff Size: Adult long)   Pulse 98   Temp 98.6 °F (37 °C) (Temporal)   Ht 5' 6\" (1.676 m)   Wt 207 lb (93.9 kg)   SpO2 96%   BMI 33.41 kg/m²     Body mass index is 33.41 kg/m². General:   Conversant, cooperative   Eyes:   no nystagmus                Chest/Lungs:    CVS:         Neuro:  Speech fluent, face symmetrical             Assessment:       ICD-10-CM ICD-9-CM    1. SUNITHA (obstructive sleep apnea)  G47.33 327.23 POLYSOMNOGRAPHY 1 NIGHT   2. Non-restorative sleep  G47.8 780.59      Ongoing problems with nonrestorative sleep. Potentially HSAT underestimated potential sleep disordered breathing. She will be evaluated with a PSG. Results to be reviewed with her. Plan:     Orders Placed This Encounter    POLYSOMNOGRAPHY 1 NIGHT     Standing Status:   Future     Standing Expiration Date:   11/11/2021     Order Specific Question:   Reason for Exam     Answer:   non-restorative sleep       *A copy of HSAT was provided to the patient and reviewed in detail. *Patient will be evaluated with a PSG  * Treatment options if indicated were reviewed today. Potential benefit of weight reduction       Huang Linder MD, FAASM  Electronically signed 05/11/21        This note was created using voice recognition software. Despite editing, there may be syntax errors. This note will not be viewable in [address]

## 2021-05-13 ENCOUNTER — TELEPHONE (OUTPATIENT)
Dept: SLEEP MEDICINE | Age: 41
End: 2021-05-13

## 2021-05-13 ENCOUNTER — DOCUMENTATION ONLY (OUTPATIENT)
Dept: SLEEP MEDICINE | Age: 41
End: 2021-05-13

## 2021-05-13 NOTE — PROGRESS NOTES
Contacted patient to schedule recommended PSG per Dr. Jonel Aragon. She would like for office to check with [facility] first for coverage. Prior authorization form completed and faxed to BRIELLE Nash. Allow 1-2 business days for processing per automated system.

## 2021-05-19 NOTE — TELEPHONE ENCOUNTER
Patient informed of plan benefits and cost estimate for PSG. She agreed to be scheduled on 6/4/2021. Sleep study instructions to be sent to her via [address]

## 2021-06-14 ENCOUNTER — HOSPITAL ENCOUNTER (OUTPATIENT)
Dept: SLEEP MEDICINE | Age: 41
Discharge: HOME | End: 2021-06-14
Attending: SPECIALIST
Payer: COMMERCIAL

## 2021-06-14 VITALS
BODY MASS INDEX: 33.27 KG/M2 | WEIGHT: 207 LBS | DIASTOLIC BLOOD PRESSURE: 85 MMHG | SYSTOLIC BLOOD PRESSURE: 128 MMHG | HEART RATE: 107 BPM | OXYGEN SATURATION: 92 % | TEMPERATURE: 98 F | HEIGHT: 66 IN

## 2021-06-14 DIAGNOSIS — G47.33 OSA (OBSTRUCTIVE SLEEP APNEA): ICD-10-CM

## 2021-06-14 PROCEDURE — 95810 POLYSOM 6/> YRS 4/> PARAM: CPT | Performed by: SPECIALIST

## 2021-06-15 ENCOUNTER — DOCUMENTATION ONLY (OUTPATIENT)
Dept: SLEEP MEDICINE | Age: 41
End: 2021-06-15
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
## 2021-06-28 ENCOUNTER — TELEPHONE (OUTPATIENT)
Dept: SLEEP MEDICINE | Age: 41
End: 2021-06-28

## 2021-06-28 NOTE — TELEPHONE ENCOUNTER
Polysomnogram performed for potential sleep disordered breathing. 484.5 minutes recorded of which 471 minutes spent asleep with a sleep efficiency of 97.2%. Sleep onset at 3.5 minutes; REM onset prolonged at 169.5 minutes with total REM representing 16.9% of sleep time. All sleep stages were observed. 4 hypopnea occurred. AHI 0.5/h. Minimal SaO2 88%. Significant snoring not noted. Impression: Sleep study does not demonstrate significant sleep disordered breathing. Advised patient of PSG results.

## 2021-08-13 ENCOUNTER — OFFICE VISIT (OUTPATIENT)
Dept: SLEEP MEDICINE | Age: 41
End: 2021-08-13
Payer: COMMERCIAL

## 2021-08-13 VITALS
SYSTOLIC BLOOD PRESSURE: 121 MMHG | BODY MASS INDEX: 33.41 KG/M2 | OXYGEN SATURATION: 94 % | DIASTOLIC BLOOD PRESSURE: 85 MMHG | HEIGHT: 66 IN | HEART RATE: 116 BPM | RESPIRATION RATE: 16 BRPM

## 2021-08-13 DIAGNOSIS — G47.19 EXCESSIVE DAYTIME SLEEPINESS: Primary | ICD-10-CM

## 2021-08-13 PROCEDURE — 99213 OFFICE O/P EST LOW 20 MIN: CPT | Performed by: SPECIALIST

## 2021-08-13 RX ORDER — CETIRIZINE HCL 10 MG
10 TABLET ORAL DAILY
COMMUNITY

## 2021-08-13 NOTE — PROGRESS NOTES
Chief Complaint   Patient presents with    Medication Evaluation     would like RX for tiredness       Visit Vitals  /85 (BP 1 Location: Left arm, BP Patient Position: Sitting, BP Cuff Size: Small adult)   Pulse (!) 116   Resp 16   Ht 5' 6\" (1.676 m)   SpO2 94%   BMI 33.41 kg/m²       1. Have you been to the ER, urgent care clinic since your last visit? Hospitalized since your last visit? No    2. Have you seen or consulted any other health care providers outside of the [address] [place] [address] since your last visit? Include any pap smears or colon screening.  No

## 2021-08-13 NOTE — PROGRESS NOTES
[address] [facility] Carlos Manuel. [place], [address]  Tel.  [phone]  Fax. 100 [facility] 60  [place], [address] [facility]  Tel.  [phone]  Fax. [phone] [address] [facility] [place], [facility]  Tel.  [phone]  Fax. [phone]         Chief Complaint       Chief Complaint   Patient presents with    Medication Evaluation     would like RX for tiredness         HPI        Aly Wellington is a 42 y.o. female seen for follow-up. She has history of snoring and daytime fatigue.  Fatigue has been more prominent during the past 1-2 years. She was evaluated with a home sleep test. Study demonstrated normal AHI 1.3/h associated with minimal SaO2 of 88%.  Snoring during 1.8% of the recording. PSG performed. 484.5 minutes recorded of which 471 minutes spent asleep with a sleep efficiency of 97.2%. Sleep onset at 3.5 minutes; REM onset prolonged at 169.5 minutes with total REM representing 16.9% of sleep time. All sleep stages were observed.     4 hypopnea occurred. AHI 0.5/h. Minimal SaO2 88%. Significant snoring not noted. She has been on Concerta 27 mg CR tablets. She notes that she is currently seeing psychiatry; dosage is being slowly tapered. She continues to experience a sense of fatigue.     Sleep studies were reviewed with her today in detail. No Known Allergies    Current Outpatient Medications   Medication Sig Dispense Refill    cetirizine (ZyrTEC) 10 mg tablet Take 10 mg by mouth daily.  melatonin 10 mg capsule Take 10 mg by mouth.  metoclopramide HCl (Reglan) 5 mg tablet Take 1 Tab by mouth Before breakfast, lunch, and dinner. 90 Tab 0    pantoprazole (PROTONIX) 40 mg tablet Take 1 Tab by mouth daily. (Patient taking differently: Take 40 mg by mouth two (2) times a day.) 30 Tab 0    Latuda 60 mg tab tablet 60 mg.      Concerta 27 mg CR tablet Take 18 mg by mouth daily.  amitriptyline (ELAVIL) 50 mg tablet Take 75 mg by mouth.       gabapentin (NEURONTIN) 300 mg capsule Take 800 mg by mouth two (2) times a day. Patient states taking 900 mg daily      montelukast (SINGULAIR) 10 mg tablet Take 10 mg by mouth At bedtime.  albuterol (PROVENTIL HFA, VENTOLIN HFA, PROAIR HFA) 90 mcg/actuation inhaler Take 2 Puffs by inhalation every six (6) hours as needed.  cyclopentolate (CYCLOGYL) 1 % ophthalmic solution Administer 1 Drop to both eyes now.  Alyacen 1/35, 28, 1-35 mg-mcg tab       Incruse Ellipta 62.5 mcg/actuation inhaler  (Patient not taking: Reported on 8/13/2021)      olopatadine (PATADAY) 0.2 % drop ophthalmic solution  (Patient not taking: Reported on 8/13/2021)      ipratropium (ATROVENT) 42 mcg (0.06 %) nasal spray 2 Sprays four (4) times daily. (Patient not taking: Reported on 8/13/2021)      traZODone (DESYREL) 50 mg tablet Take  by mouth nightly. (Patient not taking: Reported on 8/13/2021)      olopatadine (PATANASE) 0.6 % spry two (2) times a day. (Patient not taking: Reported on 8/13/2021)          She  has no past medical history on file. She  has no past surgical history on file. She family history is not on file. She  reports that she has never smoked. She has never used smokeless tobacco. She reports current alcohol use. She reports that she does not use drugs. Review of Systems:  Unchanged per patient      Objective:     Visit Vitals  /85 (BP 1 Location: Left arm, BP Patient Position: Sitting, BP Cuff Size: Small adult)   Pulse (!) 116   Resp 16   Ht 5' 6\" (1.676 m)   SpO2 94%   BMI 33.41 kg/m²     Body mass index is 33.41 kg/m². General:   Conversant, cooperative   Eyes:  Pupils equal and reactive, no nystagmus                   CVS:  Normal rate, regular rhythm        Neuro:  Speech fluent, face symmetrical             Assessment:       ICD-10-CM ICD-9-CM    1. Excessive daytime sleepiness  G47.19 780.54        Sensation of daytime sleepiness.   Patient's [place] Sleepiness Scale had been 7. HSAT/PSG did not demonstrate significant abnormalities. REM onset was prolonged. Studies not suggestive of idiopathic hypersomnia or narcolepsy. Patient notes that she is following up with psychiatry with medications to be changed. Reevaluation if requested per psychiatry. Plan:   No orders of the defined types were placed in this encounter. *A copy of sleep studies was provided to the patient and reviewed in detail. * Treatment options if indicated were reviewed today. Fernando Chiu MD, [facility]  Electronically signed 08/13/21        This note was created using voice recognition software. Despite editing, there may be syntax errors. This note will not be viewable in [address]

## 2021-08-16 ENCOUNTER — VIRTUAL VISIT (OUTPATIENT)
Dept: FAMILY MEDICINE CLINIC | Age: 41
End: 2021-08-16
Payer: COMMERCIAL

## 2021-08-16 DIAGNOSIS — K21.9 GASTROESOPHAGEAL REFLUX DISEASE WITHOUT ESOPHAGITIS: Primary | ICD-10-CM

## 2021-08-16 PROCEDURE — 99213 OFFICE O/P EST LOW 20 MIN: CPT | Performed by: FAMILY MEDICINE

## 2021-08-16 NOTE — PROGRESS NOTES
1. Have you been to the ER, urgent care clinic since your last visit? Hospitalized since your last visit? No    2. Have you seen or consulted any other health care providers outside of the [address] [place] [address] since your last visit? Include any pap smears or colon screening. No    Chief Complaint   Patient presents with    Medication Refill     pantoprazole     Health Maintenance Due   Topic Date Due    Hepatitis C Screening  Never done    DTaP/Tdap/Td series (1 - Tdap) Never done    PAP AKA CERVICAL CYTOLOGY  Never done    Lipid Screen  Never done     3 most recent PHQ Screens 4/13/2021   Little interest or pleasure in doing things Not at all   Feeling down, depressed, irritable, or hopeless Not at all   Total Score PHQ 2 0     Abuse Screening Questionnaire 8/16/2021   Do you ever feel afraid of your partner? N   Are you in a relationship with someone who physically or mentally threatens you? N   Is it safe for you to go home?  Edna Segal

## 2021-08-16 NOTE — PROGRESS NOTES
Suresh Sierra is a 42 y.o. female who was seen by synchronous (real-time) audio-video technology on 8/16/2021 for Medication Refill (pantoprazole)    This was originally written by prior MD and the refill request went to that evonne  Needs refill  She still has acid reflux      Assessment & Plan:   Diagnoses and all orders for this visit:    1. Gastroesophageal reflux disease without esophagitis      Refilled protonix for 90 days    Eat smaller meals, get a wedge pillow and make sure stomach is empty when avtar down  Want to try to stop the PPI within the 90 days  Subjective:       Prior to Admission medications    Medication Sig Start Date End Date Taking? Authorizing Provider   cetirizine (ZyrTEC) 10 mg tablet Take 10 mg by mouth daily. Yes Provider, Historical   melatonin 10 mg capsule Take 10 mg by mouth. 2/19/21  Yes Provider, Historical   metoclopramide HCl (Reglan) 5 mg tablet Take 1 Tab by mouth Before breakfast, lunch, and dinner. 1/11/21  Yes Oniel Jeffries MD   Latuda 60 mg tab tablet 80 mg. 12/16/20  Yes Provider, Historical   Concerta 27 mg CR tablet Take 18 mg by mouth daily. 11/21/20  Yes Provider, Historical   amitriptyline (ELAVIL) 50 mg tablet Take 75 mg by mouth. 10/24/20  Yes Provider, Historical   gabapentin (NEURONTIN) 300 mg capsule Take 800 mg by mouth two (2) times a day. Patient states taking 900 mg daily 10/24/20  Yes Provider, Historical   montelukast (SINGULAIR) 10 mg tablet Take 10 mg by mouth At bedtime. Yes Provider, Historical   albuterol (PROVENTIL HFA, VENTOLIN HFA, PROAIR HFA) 90 mcg/actuation inhaler Take 2 Puffs by inhalation every six (6) hours as needed. Yes Provider, Historical   cyclopentolate (CYCLOGYL) 1 % ophthalmic solution Administer 1 Drop to both eyes now. Yes Provider, Historical   Alyacen 1/35, 28, 1-35 mg-mcg tab Daily 12/17/20  Yes Provider, Historical   pantoprazole (PROTONIX) 40 mg tablet Take 1 Tab by mouth daily.   Patient not taking: Reported on 8/16/2021 1/7/21   MD Kaye Jones Ellipta 62.5 mcg/actuation inhaler  12/7/20   Provider, Historical   olopatadine (PATADAY) 0.2 % drop ophthalmic solution  12/1/20   Provider, Historical   ipratropium (ATROVENT) 42 mcg (0.06 %) nasal spray 2 Sprays four (4) times daily. Patient not taking: Reported on 8/13/2021    Provider, Historical   traZODone (DESYREL) 50 mg tablet Take  by mouth nightly. Patient not taking: Reported on 8/13/2021    Provider, Historical   olopatadine (PATANASE) 0.6 % spry two (2) times a day.   Patient not taking: Reported on 8/13/2021    Provider, Historical         ROS    Objective:     Patient-Reported Vitals 4/13/2021   Patient-Reported Weight 207        [INSTRUCTIONS:  \"[x]\" Indicates a positive item  \"[]\" Indicates a negative item  -- DELETE ALL ITEMS NOT EXAMINED]    Constitutional: [x] Appears well-developed and well-nourished [x] No apparent distress      [] Abnormal -     Mental status: [x] Alert and awake  [x] Oriented to person/place/time [x] Able to follow commands    [] Abnormal -     Eyes:   EOM    [x]  Normal    [] Abnormal -   Sclera  [x]  Normal    [] Abnormal -          Discharge [x]  None visible   [] Abnormal -     HENT: [x] Normocephalic, atraumatic  [] Abnormal -   [x] Mouth/Throat: Mucous membranes are moist    External Ears [x] Normal  [] Abnormal -    Neck: [x] No visualized mass [] Abnormal -     Pulmonary/Chest: [x] Respiratory effort normal   [x] No visualized signs of difficulty breathing or respiratory distress        [] Abnormal -      Musculoskeletal:   [x] Normal gait with no signs of ataxia         [x] Normal range of motion of neck        [] Abnormal -     Neurological:        [x] No Facial Asymmetry (Cranial nerve 7 motor function) (limited exam due to video visit)          [x] No gaze palsy        [] Abnormal -          Skin:        [x] No significant exanthematous lesions or discoloration noted on facial skin         [] Abnormal - Psychiatric:       [x] Normal Affect [] Abnormal -        [x] No Hallucinations    Other pertinent observable physical exam findings:-        We discussed the expected course, resolution and complications of the diagnosis(es) in detail. Medication risks, benefits, costs, interactions, and alternatives were discussed as indicated. I advised her to contact the office if her condition worsens, changes or fails to improve as anticipated. She expressed understanding with the diagnosis(es) and plan. Margarita Abreu, was evaluated through a synchronous (real-time) audio-video encounter. The patient (or guardian if applicable) is aware that this is a billable service. Verbal consent to proceed has been obtained within the past 12 months. The visit was conducted pursuant to the emergency declaration under the [address] [place] [address] authority and the [employer] and [employer] General Act. Patient identification was verified, and a caregiver was present when appropriate. The patient was located in a state where the provider was credentialed to provide care.       Rajwinder Piña MD

## 2021-08-19 ENCOUNTER — TELEPHONE (OUTPATIENT)
Dept: FAMILY MEDICINE CLINIC | Age: 41
End: 2021-08-19

## 2021-08-19 NOTE — TELEPHONE ENCOUNTER
----- Message from Leonardo Jensen sent at 8/19/2021 11:05 AM EDT -----  Regarding: Dr. Rishabh Alberto: [phone]  General Message/Vendor Calls    Caller's first and last name: Pt.       Reason for call: Had VV appointment Monday, was getting Rx refill. Pharmacy does not have Rx. Pt wants to know why Rx was not sent to pharmacy. Callback required yes/no and why: Yes, call back from nurse. Best contact number(s): [phone]      Details to clarify the request: N/a.       Leonardo Jensen

## 2021-08-20 RX ORDER — PANTOPRAZOLE SODIUM 40 MG/1
40 TABLET, DELAYED RELEASE ORAL DAILY
Qty: 90 TABLET | Refills: 0 | Status: SHIPPED | OUTPATIENT
Start: 2021-08-20

## 2022-01-15 LAB
CRP SERPL HS-MCNC: 35.44 MG/L (ref 0–3)
ERYTHROCYTE [SEDIMENTATION RATE] IN BLOOD BY WESTERGREN METHOD: 13 MM/HR (ref 0–32)
FERRITIN SERPL-MCNC: 255 NG/ML (ref 15–150)
HFE GENE MUT ANL BLD/T: NORMAL
IRON SATN MFR SERPL: 12 % (ref 15–55)
IRON SERPL-MCNC: 44 UG/DL (ref 27–159)
TIBC SERPL-MCNC: 358 UG/DL (ref 250–450)
UIBC SERPL-MCNC: 314 UG/DL (ref 131–425)

## 2022-01-17 NOTE — PROGRESS NOTES
Your testing for hemochromatosis was negative, which is great. Your iron levels and ferritin are no longer high.

## 2022-01-19 NOTE — PROGRESS NOTES
Patient return call about results. Advised results to patient. She voiced questions regarding her elevated CRP level and sonya spoke directly to patient to advise result.

## 2022-03-18 PROBLEM — F50.9 EATING DISORDER IN REMISSION: Status: ACTIVE | Noted: 2021-04-13

## 2022-03-19 PROBLEM — F32.A DEPRESSIVE DISORDER: Status: ACTIVE | Noted: 2020-07-14

## 2022-11-10 ENCOUNTER — VIRTUAL VISIT (OUTPATIENT)
Dept: FAMILY MEDICINE CLINIC | Age: 42
End: 2022-11-10
Payer: COMMERCIAL

## 2022-11-10 DIAGNOSIS — B37.0 THRUSH: Primary | ICD-10-CM

## 2022-11-10 PROCEDURE — 99213 OFFICE O/P EST LOW 20 MIN: CPT | Performed by: NURSE PRACTITIONER

## 2022-11-10 RX ORDER — LIDOCAINE HYDROCHLORIDE 20 MG/ML
15 SOLUTION OROPHARYNGEAL AS NEEDED
Qty: 1 EACH | Refills: 0 | Status: SHIPPED | OUTPATIENT
Start: 2022-11-10

## 2022-11-10 RX ORDER — FLUCONAZOLE 100 MG/1
TABLET ORAL
Qty: 28 TABLET | Refills: 0 | Status: SHIPPED | OUTPATIENT
Start: 2022-11-10

## 2022-11-10 NOTE — PROGRESS NOTES
Elena Morgan is a 36 y.o. female who was seen by synchronous (real-time) audio-video technology on 11/10/2022 for No chief complaint on file. Assessment & Plan:   Diagnoses and all orders for this visit:    1. Thrush  -     fluconazole (DIFLUCAN) 100 mg tablet; Take 2 tablets by mouth daily for 7 to 14 days based on symptom response  -     lidocaine (XYLOCAINE) 2 % solution; Take 15 mL by mouth as needed for Pain. - Worsening, start fluconazole as directed, side effects discussed, lidocaine swish for pain. Follow up if symptoms persist      I spent at least 16 minutes on this visit with this established patient. Subjective:   VV today thinking she has thrush  Was on an antibiotic last week for URI  Noticed last week mouth is raw, hurts to swallow, tongue and throat are sore and white and yellow  Was on a zpack. URI cleared up  Has never had thrush before. Prior to Admission medications    Medication Sig Start Date End Date Taking? Authorizing Provider   fluconazole (DIFLUCAN) 100 mg tablet Take 2 tablets by mouth daily for 7 to 14 days based on symptom response 11/10/22  Yes Yajaira Garcia NP   lidocaine (XYLOCAINE) 2 % solution Take 15 mL by mouth as needed for Pain. 11/10/22  Yes Yajaira Garcia NP   pantoprazole (PROTONIX) 40 mg tablet Take 1 Tablet by mouth daily. 8/20/21   Beverly Stokes MD   cetirizine (ZyrTEC) 10 mg tablet Take 10 mg by mouth daily. Provider, Historical   melatonin 10 mg capsule Take 10 mg by mouth. 2/19/21   Provider, Historical   metoclopramide HCl (Reglan) 5 mg tablet Take 1 Tab by mouth Before breakfast, lunch, and dinner. 1/11/21   Beverly Stokes MD   Latuda 60 mg tab tablet 80 mg. 12/16/20   Provider, Historical   Concerta 27 mg CR tablet Take 18 mg by mouth daily. 11/21/20   Provider, Historical   amitriptyline (ELAVIL) 50 mg tablet Take 75 mg by mouth.  10/24/20   Provider, Historical   gabapentin (NEURONTIN) 300 mg capsule Take 800 mg by mouth two (2) times a day. Patient states taking 900 mg daily 10/24/20   Provider, Historical   montelukast (SINGULAIR) 10 mg tablet Take 10 mg by mouth At bedtime. Provider, Historical   albuterol (PROVENTIL HFA, VENTOLIN HFA, PROAIR HFA) 90 mcg/actuation inhaler Take 2 Puffs by inhalation every six (6) hours as needed. Provider, Historical   Incruse Ellipta 62.5 mcg/actuation inhaler  12/7/20   Provider, Historical   olopatadine (PATADAY) 0.2 % drop ophthalmic solution  12/1/20   Provider, Historical   cyclopentolate (CYCLOGYL) 1 % ophthalmic solution Administer 1 Drop to both eyes now. Provider, Historical   ipratropium (ATROVENT) 42 mcg (0.06 %) nasal spray 2 Sprays four (4) times daily. Patient not taking: Reported on 8/13/2021    Provider, Historical   Alyacen 1/35, 28, 1-35 mg-mcg tab Daily 12/17/20   Provider, Historical   traZODone (DESYREL) 50 mg tablet Take  by mouth nightly. Patient not taking: Reported on 8/13/2021    Provider, Historical   olopatadine (PATANASE) 0.6 % spry two (2) times a day. Patient not taking: Reported on 8/13/2021    Provider, Historical     Patient Active Problem List   Diagnosis Code    Depressive disorder F32. A    Eating disorder in remission F50.9       Review of Systems   Constitutional:  Negative for chills, fever and malaise/fatigue. HENT:          Mouth has pain inside   Eyes:  Negative for blurred vision. Respiratory:  Negative for cough and shortness of breath. Cardiovascular:  Negative for chest pain, palpitations and leg swelling. Neurological:  Negative for dizziness and headaches.      Objective:     Patient-Reported Vitals 11/10/2022   Patient-Reported Weight 209   Patient-Reported Temperature 98.6        [INSTRUCTIONS:  \"[x]\" Indicates a positive item  \"[]\" Indicates a negative item  -- DELETE ALL ITEMS NOT EXAMINED]    Constitutional: [x] Appears well-developed and well-nourished [x] No apparent distress      [] Abnormal -     Mental status: [x] Alert and awake  [x] Oriented to person/place/time [x] Able to follow commands    [] Abnormal -     Eyes:   EOM    [x]  Normal    [] Abnormal -   Sclera  [x]  Normal    [] Abnormal -          Discharge [x]  None visible   [] Abnormal -     HENT: [x] Normocephalic, atraumatic  [] Abnormal -   [] Mouth/Throat: Mucous membranes are moist, red with white tongue    External Ears [x] Normal  [] Abnormal -    Neck: [x] No visualized mass [] Abnormal -     Pulmonary/Chest: [x] Respiratory effort normal   [x] No visualized signs of difficulty breathing or respiratory distress        [] Abnormal -      Musculoskeletal:   [x] Normal gait with no signs of ataxia         [x] Normal range of motion of neck        [] Abnormal -     Neurological:        [x] No Facial Asymmetry (Cranial nerve 7 motor function) (limited exam due to video visit)          [x] No gaze palsy        [] Abnormal -          Skin:        [x] No significant exanthematous lesions or discoloration noted on facial skin         [] Abnormal -            Psychiatric:       [x] Normal Affect [] Abnormal -        [x] No Hallucinations    Other pertinent observable physical exam findings:-        We discussed the expected course, resolution and complications of the diagnosis(es) in detail. Medication risks, benefits, costs, interactions, and alternatives were discussed as indicated. I advised her to contact the office if her condition worsens, changes or fails to improve as anticipated. She expressed understanding with the diagnosis(es) and plan. Savanna Perez, was evaluated through a synchronous (real-time) audio-video encounter. The patient (or guardian if applicable) is aware that this is a billable service, which includes applicable co-pays. This Virtual Visit was conducted with patient's (and/or legal guardian's) consent.  The visit was conducted pursuant to the emergency declaration under the [facility] waiver authority and the [employer] and [employer] Act. Patient identification was verified, and a caregiver was present when appropriate.   The patient was located at: Home: [address] [facility]  The provider was located at: Home: [unfilled]        Grace Tidwell NP

## 2022-12-16 ENCOUNTER — NURSE TRIAGE (OUTPATIENT)
Dept: OTHER | Facility: CLINIC | Age: 42
End: 2022-12-16

## 2022-12-16 NOTE — TELEPHONE ENCOUNTER
Location of patient: [place] [facility] [address] call from [place] at [facility] with [employer]. Subjective: Caller states \"check up, feels tired, sleeps during the day\"     Current Symptoms: recent labs, had high blood sugar and thyroid level off. Has asthma too. Onset: 2 weeks ago; unchanged    Associated Symptoms: reduced activity, increased sleepiness, increased thirst    Pain Severity: 0/10; Temperature: n/a     What has been tried: nothing    LMP: NA Pregnant: NA takes BC continuously    Recommended disposition: See HCP within 4 Hours (or PCP triage)    Care advice provided, patient verbalizes understanding; denies any other questions or concerns; instructed to call back for any new or worsening symptoms. Patient/Caller agrees with recommended disposition; writer provided warm transfer to Palmer Cardoza at [facility] for appointment scheduling    Attention Provider: Thank you for allowing me to participate in the care of your patient. The patient was connected to triage in response to information provided to the E[facility]. Please do not respond through this encounter as the response is not directed to a shared pool.         Reason for Disposition   [1] MODERATE weakness (i.e., interferes with work, school, normal activities) AND [2] cause unknown  (Exceptions: weakness with acute minor illness, or weakness from poor fluid intake)    Protocols used: Weakness (Generalized) and Fatigue-ADULT-AH

## 2022-12-19 ENCOUNTER — OFFICE VISIT (OUTPATIENT)
Dept: FAMILY MEDICINE CLINIC | Age: 42
End: 2022-12-19
Payer: COMMERCIAL

## 2022-12-19 VITALS
HEART RATE: 106 BPM | OXYGEN SATURATION: 96 % | DIASTOLIC BLOOD PRESSURE: 85 MMHG | HEIGHT: 66 IN | WEIGHT: 207 LBS | SYSTOLIC BLOOD PRESSURE: 134 MMHG | BODY MASS INDEX: 33.27 KG/M2 | TEMPERATURE: 97 F | RESPIRATION RATE: 16 BRPM

## 2022-12-19 DIAGNOSIS — F50.81 BINGE EATING DISORDER: ICD-10-CM

## 2022-12-19 DIAGNOSIS — E74.39 GLUCOSE INTOLERANCE: Primary | ICD-10-CM

## 2022-12-19 DIAGNOSIS — E03.8 OTHER SPECIFIED HYPOTHYROIDISM: ICD-10-CM

## 2022-12-19 PROCEDURE — 99213 OFFICE O/P EST LOW 20 MIN: CPT | Performed by: FAMILY MEDICINE

## 2022-12-19 RX ORDER — LITHIUM CARBONATE 300 MG
300 TABLET ORAL 3 TIMES DAILY
COMMUNITY

## 2022-12-19 NOTE — PROGRESS NOTES
Noelle Mccollum  36 y.o. female  1980  GOP:[id number]  [facility] MEDICINE  Progress Note     Encounter Date: 12/19/2022    Assessment and Plan:     Encounter Diagnoses     ICD-10-CM ICD-9-CM   1. Glucose intolerance  E74.39 271.3   2. Other specified hypothyroidism  E03.8 244.8   3. Binge eating disorder  F50.81 307.50       1. Glucose intolerance  12 hour overnight fast, exercise, no sugar diet    2. Other specified hypothyroidism  Subclinical with TSH 5.3  Recheck 6 months    3. Binge eating disorder  Already working with nutritionist  Continue that work which should dovetail with diet for glucose intolerance. FU with [facility] as scheduled. I have discussed the diagnosis with the patient and the intended plan as seen in the above orders. she has expressed understanding. The patient has received an after-visit summary and questions were answered concerning future plans. I have discussed medication side effects and warnings with the patient as well. Electronically Signed: Keyur Lua MD    Current Medications after this visit     Current Outpatient Medications   Medication Sig    lithium carbonate 300 mg tablet Take 300 mg by mouth three (3) times daily. metoclopramide HCl (Reglan) 5 mg tablet Take 1 Tab by mouth Before breakfast, lunch, and dinner. montelukast (SINGULAIR) 10 mg tablet Take 10 mg by mouth At bedtime. albuterol (PROVENTIL HFA, VENTOLIN HFA, PROAIR HFA) 90 mcg/actuation inhaler Take 2 Puffs by inhalation every six (6) hours as needed. olopatadine (PATADAY) 0.2 % drop ophthalmic solution  (Patient not taking: Reported on 8/13/2021)    Rosario Alma Rosa 1/35, 28, 1-35 mg-mcg tab Daily     No current facility-administered medications for this visit.      Medications Discontinued During This Encounter   Medication Reason    amitriptyline (ELAVIL) 50 mg tablet Not A Current Medication    cetirizine (ZyrTEC) 10 mg tablet Not A Current Medication    Concerta 27 mg CR tablet Not A Current Medication    cyclopentolate (CYCLOGYL) 1 % ophthalmic solution Not A Current Medication    fluconazole (DIFLUCAN) 100 mg tablet Not A Current Medication    melatonin 10 mg capsule Not A Current Medication    lidocaine (XYLOCAINE) 2 % solution Not A Current Medication    Latuda 60 mg tab tablet Not A Current Medication    ipratropium (ATROVENT) 42 mcg (0.06 %) nasal spray Not A Current Medication    Incruse Ellipta 62.5 mcg/actuation inhaler Not A Current Medication    gabapentin (NEURONTIN) 300 mg capsule Not A Current Medication    pantoprazole (PROTONIX) 40 mg tablet Not A Current Medication    olopatadine (PATANASE) 0.6 % spry Not A Current Medication    traZODone (DESYREL) 50 mg tablet Not A Current Medication     ~~~~~~~~~~~~~~~~~~~~~~~~~~~~~~~~~~~~~~~~~~~~~~~~~~~~~~~~~~~    Chief Complaint   Patient presents with    Fatigue    Results       History provided by patient  History of Present Illness   Ivis Dos Santos is a 36 y.o. female who presents to clinic today for:  Fatigue and Results    Had lithium increased November 30th dose went up and level was drawn  Had labs done and TSH was up and blood sugar was too  Mom is an NP and advised her to be seen. Sees Dr. Tenisha Martínez for [facility]. Glucose was 102 fasting  TSH was 5.3  Normal T4. Sees nutritionist for binge eating disorder. Has to fax her a picture of every meal    Health Maintenance  Will do at future visit  Health Maintenance Due   Topic Date Due    Hepatitis C Screening  Never done    DTaP/Tdap/Td series (1 - Tdap) Never done    Cervical cancer screen  Never done    Lipid Screen  Never done    COVID-19 Vaccine (3 - Booster for Pfizer series) 06/07/2021    Flu Vaccine (1) 08/01/2022     Review of Systems   Review of Systems   Respiratory:  Negative for shortness of breath. Cardiovascular:  Negative for chest pain. Neurological:  Negative for tremors. Psychiatric/Behavioral: Negative.         Vitals/Objective:     Vitals: 12/19/22 1303   BP: 134/85   Pulse: (!) 106   Resp: 16   Temp: 97 °F (36.1 °C)   TempSrc: Skin   SpO2: 96%   Weight: 207 lb (93.9 kg)   Height: 5' 6\" (1.676 m)     Body mass index is 33.41 kg/m². Wt Readings from Last 3 Encounters:   12/19/22 207 lb (93.9 kg)   06/14/21 207 lb (93.9 kg)   05/11/21 207 lb (93.9 kg)         Objective  Physical Exam  Vitals and nursing note reviewed. Constitutional:       Appearance: Normal appearance. She is not toxic-appearing. HENT:      Head: Normocephalic and atraumatic. Neck:      Thyroid: No thyroid mass, thyromegaly or thyroid tenderness. Cardiovascular:      Rate and Rhythm: Normal rate and regular rhythm. Heart sounds: Normal heart sounds. No murmur heard. No gallop. Pulmonary:      Effort: Pulmonary effort is normal. No respiratory distress. Breath sounds: Normal breath sounds. No wheezing, rhonchi or rales. Musculoskeletal:      Cervical back: No muscular tenderness. Right lower leg: No edema. Left lower leg: No edema. Lymphadenopathy:      Cervical: No cervical adenopathy. Neurological:      Mental Status: She is alert. Psychiatric:         Mood and Affect: Mood normal.         Behavior: Behavior normal.         Thought Content: Thought content normal.         Judgment: Judgment normal.         No results found for this or any previous visit (from the past 24 hour(s)). Disposition     Follow-up and Dispositions    Return for Review test results, Medication follow up. No future appointments. History   Patient's past medical, surgical and family histories were reviewed and updated. History reviewed. No pertinent past medical history. No past surgical history on file. No family history on file.   Social History     Tobacco Use    Smoking status: Never    Smokeless tobacco: Never   Substance Use Topics    Alcohol use: Yes     Comment: weekends    Drug use: Never       Allergies   No Known Allergies

## 2022-12-19 NOTE — PROGRESS NOTES
1. Have you been to the ER, urgent care clinic since your last visit? Hospitalized since your last visit? Yes When: 9/22 Where: Urgent Care Reason for visit: Covid    2. Have you seen or consulted any other health care providers outside of the [address] [place] [address] since your last visit? Include any pap smears or colon screening. No      Chief Complaint   Patient presents with    Fatigue    Results     Health Maintenance Due   Topic Date Due    Hepatitis C Screening  Never done    DTaP/Tdap/Td series (1 - Tdap) Never done    Cervical cancer screen  Never done    Lipid Screen  Never done    COVID-19 Vaccine (3 - Booster for Pfizer series) 06/07/2021    Flu Vaccine (1) 08/01/2022     3 most recent PHQ Screens 12/19/2022   Little interest or pleasure in doing things Several days   Feeling down, depressed, irritable, or hopeless Several days   Total Score PHQ 2 2   Trouble falling or staying asleep, or sleeping too much -   Feeling tired or having little energy -   Poor appetite, weight loss, or overeating -   Feeling bad about yourself - or that you are a failure or have let yourself or your family down -   Trouble concentrating on things such as school, work, reading, or watching TV -   Moving or speaking so slowly that other people could have noticed; or the opposite being so fidgety that others notice -   Thoughts of being better off dead, or hurting yourself in some way -   PHQ 9 Score -   How difficult have these problems made it for you to do your work, take care of your home and get along with others -     Abuse Screening Questionnaire 12/19/2022   Do you ever feel afraid of your partner? N   Are you in a relationship with someone who physically or mentally threatens you? N   Is it safe for you to go home?  Y     Visit Vitals  /85 (BP 1 Location: Left upper arm, BP Patient Position: Sitting, BP Cuff Size: Adult)   Pulse (!) 106   Temp 97 °F (36.1 °C) (Skin)   Resp 16   Ht 5' 6\" (1.676 m)   Wt 207 lb (93.9 kg)   SpO2 96%   BMI 33.41 kg/m²

## 2023-01-19 ENCOUNTER — OFFICE VISIT (OUTPATIENT)
Dept: FAMILY MEDICINE CLINIC | Age: 43
End: 2023-01-19
Payer: COMMERCIAL

## 2023-01-19 VITALS
HEIGHT: 66 IN | RESPIRATION RATE: 16 BRPM | TEMPERATURE: 97.6 F | SYSTOLIC BLOOD PRESSURE: 119 MMHG | BODY MASS INDEX: 33.27 KG/M2 | DIASTOLIC BLOOD PRESSURE: 81 MMHG | HEART RATE: 113 BPM | OXYGEN SATURATION: 99 % | WEIGHT: 207 LBS

## 2023-01-19 DIAGNOSIS — R79.89 ABNORMAL TSH: Primary | ICD-10-CM

## 2023-01-19 PROCEDURE — 99213 OFFICE O/P EST LOW 20 MIN: CPT | Performed by: FAMILY MEDICINE

## 2023-01-19 NOTE — PROGRESS NOTES
Chief Complaint   Patient presents with    Follow-up    Thyroid Problem            she is a 36y.o. year old female who presents for evalution. Her psychiatrist started her on lithium   When the lab was checked her tsh was abn  In 2020 it was high in 2021 it was nl, then 2022 was abn again  Her psychiatrist sent her here to see if a low dose of levothyroxine would help her depression  Reviewed PmHx, RxHx, FmHx, SocHx, AllgHx and updated and dated in the chart. Aspirin yes ____   No____ N/A____    Patient Active Problem List    Diagnosis    Eating disorder in remission    Depressive disorder       Nurse notes were reviewed and copied and are correct  Review of Systems - negative except as listed above in the HPI    Objective:     Vitals:    01/19/23 1424   BP: 119/81   Pulse: (!) 113   Resp: 16   Temp: 97.6 °F (36.4 °C)   TempSrc: Skin   SpO2: 99%   Weight: 207 lb (93.9 kg)   Height: 5' 6\" (1.676 m)     Physical Examination: General appearance - alert, well appearing, and in no distress  Mental status - alert, oriented to person, place, and time  Chest - clear to auscultation, no wheezes, rales or rhonchi, symmetric air entry  Heart - normal rate, regular rhythm, normal S1, S2, no murmurs, rubs, clicks or gallops         Assessment/ Plan:   Diagnoses and all orders for this visit:    1. Abnormal TSH  -     TSH 3RD GENERATION; Future  -     T4, FREE; Future   If significantly low I will write rx for thyroid hormone          ICD-10-CM ICD-9-CM    1. Abnormal TSH  R79.89 790.6 TSH 3RD GENERATION      T4, FREE          I have discussed the diagnosis with the patient and the intended plan as seen in the above orders. The patient has received an after-visit summary  if not on [employer] and questions were answered concerning future plans.  Patients in [employer] have access to avs without printing    Medication Side Effects and Warnings were discussed with patient:   Patient Labs were reviewed and or requested:   Patient Past Records were reviewed and or requested: yes        There are no Patient Instructions on file for this visit.

## 2023-01-19 NOTE — PROGRESS NOTES
1. Have you been to the ER, urgent care clinic since your last visit? Hospitalized since your last visit? No    2. Have you seen or consulted any other health care providers outside of the [address] [place] [address] since your last visit? Include any pap smears or colon screening. No    Chief Complaint   Patient presents with    Follow-up     Health Maintenance Due   Topic Date Due    Hepatitis C Screening  Never done    DTaP/Tdap/Td series (1 - Tdap) Never done    Cervical cancer screen  Never done    Lipid Screen  Never done    COVID-19 Vaccine (3 - Booster for Pfizer series) 06/07/2021    Flu Vaccine (1) 08/01/2022     3 most recent PHQ Screens 1/19/2023   Little interest or pleasure in doing things Not at all   Feeling down, depressed, irritable, or hopeless Not at all   Total Score PHQ 2 0   Trouble falling or staying asleep, or sleeping too much -   Feeling tired or having little energy -   Poor appetite, weight loss, or overeating -   Feeling bad about yourself - or that you are a failure or have let yourself or your family down -   Trouble concentrating on things such as school, work, reading, or watching TV -   Moving or speaking so slowly that other people could have noticed; or the opposite being so fidgety that others notice -   Thoughts of being better off dead, or hurting yourself in some way -   PHQ 9 Score -   How difficult have these problems made it for you to do your work, take care of your home and get along with others -     Abuse Screening Questionnaire 1/19/2023   Do you ever feel afraid of your partner? N   Are you in a relationship with someone who physically or mentally threatens you? N   Is it safe for you to go home?  Y     Visit Vitals  /81 (BP 1 Location: Left upper arm, BP Patient Position: Sitting, BP Cuff Size: Adult)   Pulse (!) 113   Temp 97.6 °F (36.4 °C) (Skin)   Resp 16   Ht 5' 6\" (1.676 m)   Wt 207 lb (93.9 kg)   SpO2 99%   BMI 33.41 kg/m²

## 2023-01-20 LAB
T4 FREE SERPL-MCNC: 0.8 NG/DL (ref 0.8–1.5)
TSH SERPL DL<=0.05 MIU/L-ACNC: 4.78 UIU/ML (ref 0.36–3.74)

## 2023-01-28 RX ORDER — LEVOTHYROXINE SODIUM 25 UG/1
25 TABLET ORAL
Qty: 30 TABLET | Refills: 1 | Status: SHIPPED | OUTPATIENT
Start: 2023-01-28

## 2023-01-28 NOTE — PROGRESS NOTES
The thyroid level is on the low end of the normal zone. It has been low a few times in the past. It may help your depression to increase the hormone level a little bit. I will send a low dose to the pharmacy.  I want to recheck the level in 1 month to make sure we do not make the hormone level too high

## 2023-01-30 ENCOUNTER — VIRTUAL VISIT (OUTPATIENT)
Dept: FAMILY MEDICINE CLINIC | Age: 43
End: 2023-01-30
Payer: COMMERCIAL

## 2023-01-30 DIAGNOSIS — E03.8 OTHER SPECIFIED HYPOTHYROIDISM: Primary | ICD-10-CM

## 2023-01-30 PROCEDURE — 99213 OFFICE O/P EST LOW 20 MIN: CPT | Performed by: FAMILY MEDICINE

## 2023-01-30 NOTE — PROGRESS NOTES
Identified pt with two pt identifiers(name and ). Reviewed record in preparation for visit and have obtained necessary documentation. Chief Complaint   Patient presents with    Labs     Discuss results        Health Maintenance Due   Topic    Hepatitis C Screening     DTaP/Tdap/Td series (1 - Tdap)    Cervical cancer screen     Lipid Screen     COVID-19 Vaccine (3 - Booster for Kwok Peter series)    Flu Vaccine (1)       Coordination of Care Questionnaire:  :   1) Have you been to an emergency room, urgent care, or hospitalized since your last visit? If yes, where when, and reason for visit? no       2. Have seen or consulted any other health care provider since your last visit? If yes, where when, and reason for visit? NO        Patient is accompanied by self I have received verbal consent from Lorena Finders to discuss any/all medical information while they are present in the room.

## 2023-01-30 NOTE — PROGRESS NOTES
Luda Mane is a 36 y.o. female who was seen by synchronous (real-time) audio-video technology on 1/30/2023 for Labs (Discuss results)    She powell had a few abn tsh in the past and then it would normalize  Her depression has been dificult to manage so her psychiatrist sent her here to see if getting treated would b ok  I rechecked it and tsh was high and the T4 is at the low end of normal so I am treating with a low dose of levothyroxine   She is in agreement of trying this  After treating for 30 days she will go get labs rechecked      Assessment & Plan:   Diagnoses and all orders for this visit:    1. Other specified hypothyroidism  Treating with 25 mcg of levothyroxine  Recheck in 1 month        Subjective:       Prior to Admission medications    Medication Sig Start Date End Date Taking? Authorizing Provider   levothyroxine (SYNTHROID) 25 mcg tablet Take 1 Tablet by mouth Daily (before breakfast). 1/28/23  Yes Chano James MD   lithium carbonate 300 mg tablet Take 300 mg by mouth three (3) times daily. Yes Provider, Historical   metoclopramide HCl (Reglan) 5 mg tablet Take 1 Tab by mouth Before breakfast, lunch, and dinner. 1/11/21  Yes Chano James MD   montelukast (SINGULAIR) 10 mg tablet Take 10 mg by mouth At bedtime. Yes Provider, Historical   albuterol (PROVENTIL HFA, VENTOLIN HFA, PROAIR HFA) 90 mcg/actuation inhaler Take 2 Puffs by inhalation every six (6) hours as needed. Yes Provider, Historical   Alyacen 1/35, 28, 1-35 mg-mcg tab Daily 12/17/20  Yes Provider, Historical     Patient Active Problem List    Diagnosis Date Noted    Eating disorder in remission 04/13/2021    Depressive disorder 07/14/2020     Current Outpatient Medications   Medication Sig Dispense Refill    levothyroxine (SYNTHROID) 25 mcg tablet Take 1 Tablet by mouth Daily (before breakfast). 30 Tablet 1    lithium carbonate 300 mg tablet Take 300 mg by mouth three (3) times daily.       metoclopramide HCl (Reglan) 5 mg tablet Take 1 Tab by mouth Before breakfast, lunch, and dinner. 90 Tab 0    montelukast (SINGULAIR) 10 mg tablet Take 10 mg by mouth At bedtime. albuterol (PROVENTIL HFA, VENTOLIN HFA, PROAIR HFA) 90 mcg/actuation inhaler Take 2 Puffs by inhalation every six (6) hours as needed.       Alyacen 1/35, 28, 1-35 mg-mcg tab Daily         ROS    Objective:     Patient-Reported Vitals 11/10/2022   Patient-Reported Weight 209   Patient-Reported Temperature 98.6        [INSTRUCTIONS:  \"[x]\" Indicates a positive item  \"[]\" Indicates a negative item  -- DELETE ALL ITEMS NOT EXAMINED]    Constitutional: [x] Appears well-developed and well-nourished [x] No apparent distress      [] Abnormal -     Mental status: [x] Alert and awake  [x] Oriented to person/place/time [x] Able to follow commands    [] Abnormal -     Eyes:   EOM    [x]  Normal    [] Abnormal -   Sclera  [x]  Normal    [] Abnormal -          Discharge [x]  None visible   [] Abnormal -     HENT: [x] Normocephalic, atraumatic  [] Abnormal -   [x] Mouth/Throat: Mucous membranes are moist    External Ears [x] Normal  [] Abnormal -    Neck: [x] No visualized mass [] Abnormal -     Pulmonary/Chest: [x] Respiratory effort normal   [x] No visualized signs of difficulty breathing or respiratory distress        [] Abnormal -      Musculoskeletal:   [x] Normal gait with no signs of ataxia         [x] Normal range of motion of neck        [] Abnormal -     Neurological:        [x] No Facial Asymmetry (Cranial nerve 7 motor function) (limited exam due to video visit)          [x] No gaze palsy        [] Abnormal -          Skin:        [x] No significant exanthematous lesions or discoloration noted on facial skin         [] Abnormal -            Psychiatric:       [x] Normal Affect [] Abnormal -        [x] No Hallucinations    Other pertinent observable physical exam findings:-        We discussed the expected course, resolution and complications of the diagnosis(es) in detail. Medication risks, benefits, costs, interactions, and alternatives were discussed as indicated. I advised her to contact the office if her condition worsens, changes or fails to improve as anticipated. She expressed understanding with the diagnosis(es) and plan. Savanna Perez, was evaluated through a synchronous (real-time) audio-video encounter. The patient (or guardian if applicable) is aware that this is a billable service, which includes applicable co-pays. This Virtual Visit was conducted with patient's (and/or legal guardian's) consent. The visit was conducted pursuant to the emergency declaration under the [address] [place] [address], [address] [place] [address] authority and the [employer] Act. Patient identification was verified, and a caregiver was present when appropriate.   The patient was located at: Home: [address] [place] [address]  The provider was located at: Home: Reyna [place], [place]

## 2023-04-04 ENCOUNTER — OFFICE VISIT (OUTPATIENT)
Dept: FAMILY MEDICINE CLINIC | Age: 43
End: 2023-04-04
Payer: COMMERCIAL

## 2023-04-04 PROBLEM — J45.20 MILD INTERMITTENT ASTHMA WITHOUT COMPLICATION: Status: ACTIVE | Noted: 2023-04-04

## 2023-04-04 PROBLEM — R10.13 DYSPEPSIA: Status: ACTIVE | Noted: 2023-04-04

## 2023-04-04 PROBLEM — K31.84 GASTROPARESIS: Status: ACTIVE | Noted: 2023-04-04

## 2023-04-04 PROBLEM — M75.41 SHOULDER IMPINGEMENT SYNDROME, RIGHT: Status: ACTIVE | Noted: 2023-04-04

## 2023-04-04 PROBLEM — F41.9 ANXIETY: Status: ACTIVE | Noted: 2023-04-04

## 2023-04-04 PROBLEM — E03.8 SUBCLINICAL HYPOTHYROIDISM: Status: ACTIVE | Noted: 2023-04-04

## 2023-04-04 PROBLEM — F32.9 TREATMENT-RESISTANT DEPRESSION: Status: ACTIVE | Noted: 2020-07-14

## 2023-04-04 PROBLEM — F51.4 NIGHT TERRORS: Status: ACTIVE | Noted: 2023-04-04

## 2023-04-04 PROBLEM — M25.819 SHOULDER IMPINGEMENT: Status: ACTIVE | Noted: 2023-04-04

## 2023-04-04 PROCEDURE — 99213 OFFICE O/P EST LOW 20 MIN: CPT | Performed by: STUDENT IN AN ORGANIZED HEALTH CARE EDUCATION/TRAINING PROGRAM

## 2023-04-04 RX ORDER — VORTIOXETINE 20 MG/1
20 TABLET, FILM COATED ORAL DAILY
Start: 2023-03-02

## 2023-04-04 RX ORDER — BUSPIRONE HYDROCHLORIDE 30 MG/1
30 TABLET ORAL 2 TIMES DAILY
Start: 2022-08-15

## 2023-04-04 RX ORDER — ERYTHROMYCIN 250 MG/1
TABLET, COATED ORAL
Start: 2023-03-24

## 2023-04-04 RX ORDER — CEPHALEXIN 250 MG/1
CAPSULE ORAL
Start: 2023-03-29

## 2023-04-04 RX ORDER — LEVOTHYROXINE SODIUM 25 UG/1
25 TABLET ORAL
Qty: 90 TABLET | Refills: 1 | Status: SHIPPED
Start: 2023-04-04

## 2023-04-04 RX ORDER — AMITRIPTYLINE HYDROCHLORIDE 100 MG/1
TABLET, FILM COATED ORAL
Start: 2023-03-02

## 2023-04-04 RX ORDER — DICLOFENAC SODIUM 75 MG/1
75 TABLET, DELAYED RELEASE ORAL 2 TIMES DAILY
Start: 2023-03-30 | End: 2023-04-29

## 2023-04-04 RX ORDER — CETIRIZINE HYDROCHLORIDE 10 MG/1
TABLET ORAL
Start: 2021-06-08

## 2023-04-04 RX ORDER — GABAPENTIN 600 MG/1
600 TABLET ORAL DAILY
Start: 2023-02-02

## 2023-04-04 RX ORDER — PRAZOSIN HYDROCHLORIDE 2 MG/1
2 CAPSULE ORAL
Start: 2022-10-13

## 2023-04-04 RX ORDER — FAMOTIDINE 20 MG/1
20 TABLET, FILM COATED ORAL 2 TIMES DAILY
Start: 2023-03-22

## 2023-04-04 RX ORDER — ESZOPICLONE 3 MG/1
3 TABLET, FILM COATED ORAL
Start: 2023-03-02

## 2023-04-04 NOTE — PROGRESS NOTES
Assessment/Plan:     Diagnoses and all orders for this visit:    1. Abnormal TSH  -     levothyroxine (SYNTHROID) 25 mcg tablet; Take 1 Tablet by mouth Daily (before breakfast). -     TSH 3RD GENERATION; Future  -History of elevated TSH with normal T4 consistent with subclinical hypothyroidism  -Given depressive symptoms patient was treated with levothyroxine 25 mcg daily  -TSH normalized after 3-week repeat check on medication  -Given she had only been on the medication for several weeks when last checked will repeat a TSH today  -Continue with routine follow-up every 6 months if TSH is normal      Follow-up and Dispositions    Return in about 6 months (around 10/4/2023), or if symptoms worsen or fail to improve. Discussed expected course/resolution/complications of diagnosis in detail with patient. Medication risks/benefits/costs/interactions/alternatives discussed with patient. Pt expressed understanding with the diagnosis and plan    Subjective:      Akila Yeung is a 36 y.o. female who presents for had concerns including Thyroid Problem (Follow up on Thyroid medications) and Depression. Current Outpatient Medications   Medication Sig Dispense Refill    amitriptyline (ELAVIL) 100 mg tablet TAKE 1 TABLET BY MOUTH AT NIGHT      busPIRone (BUSPAR) 30 mg tablet Take 30 mg by mouth two (2) times a day. cetirizine (ZYRTEC) 10 mg tablet 10 mg = 1 tab each dose, PO, daily, # 30 tab, 0 Refills      diclofenac EC (VOLTAREN) 75 mg EC tablet Take 75 mg by mouth two (2) times a day. erythromycin base (E-MYCIN) 250 mg tablet TAKE 1 TABLET BY MOUTH TWICE A DAY 30 MINUTES BEFORE LUNCH AND SUPPER      eszopiclone (LUNESTA) 3 mg tablet Take 3 mg by mouth nightly. famotidine (PEPCID) 20 mg tablet Take 20 mg by mouth two (2) times a day. Advair Diskus 100-50 mcg/dose diskus inhaler INHALE 1 PUFF BY MOUTH EVERY 12 HOURS      gabapentin (NEURONTIN) 600 mg tablet Take 600 mg by mouth daily. prazosin (MINIPRESS) 2 mg capsule Take 2 mg by mouth nightly. Trintellix 20 mg tablet Take 20 mg by mouth daily. levothyroxine (SYNTHROID) 25 mcg tablet Take 1 Tablet by mouth Daily (before breakfast). 90 Tablet 1    montelukast (SINGULAIR) 10 mg tablet Take 10 mg by mouth At bedtime. albuterol (PROVENTIL HFA, VENTOLIN HFA, PROAIR HFA) 90 mcg/actuation inhaler Take 2 Puffs by inhalation every six (6) hours as needed. Alyacen 1/35, 28, 1-35 mg-mcg tab Daily         No Known Allergies  History reviewed. No pertinent past medical history. Past Surgical History:   Procedure Laterality Date    HX LEEP PROCEDURE       History reviewed. No pertinent family history. Social History     Socioeconomic History    Marital status: SINGLE     Spouse name: Not on file    Number of children: Not on file    Years of education: Not on file    Highest education level: Not on file   Occupational History    Not on file   Tobacco Use    Smoking status: Never    Smokeless tobacco: Never   Vaping Use    Vaping Use: Never used   Substance and Sexual Activity    Alcohol use: Yes     Comment: weekends    Drug use: Never    Sexual activity: Not on file   Other Topics Concern    Not on file   Social History Narrative    Not on file     Social Determinants of Health     Financial Resource Strain: Not on file   Food Insecurity: Not on file   Transportation Needs: Not on file   Physical Activity: Not on file   Stress: Not on file   Social Connections: Not on file   Intimate Partner Violence: Not on file   Housing Stability: Not on file       Patient is a 26-year-old female who presents to the office today for follow-up of hypothyroidism. Patient has had a history of abnormal TSH. It was previously elevated with a normal T4 and she was started on low-dose levothyroxine due to symptoms of severe depression. Patient's been taking 25 mcg daily and tolerating.   She did have a TSH checked after a couple weeks of treatment that was normal but is due for repeat lab work. 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  She denies any SI. Also of note patient states she is up-to-date on her Pap smear and follows with GYN as well as a mammogram.        ROS:   Review of Systems   Constitutional:  Negative for chills and fever. HENT:  Negative for congestion. Respiratory:  Negative for cough and shortness of breath. Cardiovascular:  Negative for chest pain and leg swelling. Gastrointestinal:  Negative for abdominal pain, nausea and vomiting. Neurological:  Negative for dizziness, tingling, weakness and headaches. Psychiatric/Behavioral:  Positive for depression. Negative for suicidal ideas. Objective:   Visit Vitals  /83 (BP 1 Location: Left upper arm, BP Patient Position: Sitting, BP Cuff Size: Adult)   Pulse 100   Temp 98.2 °F (36.8 °C) (Temporal)   Resp 20   Ht 5' 6\" (1.676 m)   SpO2 97%   BMI 33.41 kg/m²         Vitals and Nurse Documentation reviewed. Physical Exam  Constitutional:       General: She is not in acute distress. Appearance: Normal appearance. She is obese. She is not toxic-appearing. HENT:      Head: Normocephalic and atraumatic. Eyes:      Conjunctiva/sclera: Conjunctivae normal.   Cardiovascular:      Rate and Rhythm: Normal rate and regular rhythm. Pulses: Normal pulses. Heart sounds: Normal heart sounds. No murmur heard. No gallop. Pulmonary:      Effort: Pulmonary effort is normal. No respiratory distress. Breath sounds: Normal breath sounds. No wheezing or rhonchi. Abdominal:      General: Bowel sounds are normal. There is no distension. Palpations: Abdomen is soft. Tenderness: There is no abdominal tenderness. There is no guarding. Musculoskeletal:      Cervical back: Neck supple. Right lower leg: No edema. Left lower leg: No edema.    Neurological: Mental Status: She is alert and oriented to person, place, and time. Gait: Gait normal.   Psychiatric:         Attention and Perception: Attention normal.         Mood and Affect: Affect normal. Mood is depressed. Speech: Speech normal.         Behavior: Behavior normal. Behavior is cooperative. Thought Content:  Thought content normal.       Results for orders placed or performed in visit on 02/27/23   T4 (THYROXINE)   Result Value Ref Range    T4, Total 8.1 4.8 - 13.9 ug/dL   TSH 3RD GENERATION   Result Value Ref Range    TSH 2.49 0.36 - 3.74 uIU/mL

## 2023-04-04 NOTE — PROGRESS NOTES
Patient stated name &   Chief Complaint   Patient presents with    Thyroid Problem     Follow up on Thyroid medications    Depression        Health Maintenance Due   Topic    Hepatitis C Screening     DTaP/Tdap/Td series (1 - Tdap)    Cervical cancer screen     Lipid Screen     COVID-19 Vaccine (3 - Booster for Pfizer series)       Wt Readings from Last 3 Encounters:   23 207 lb (93.9 kg)   22 207 lb (93.9 kg)   21 207 lb (93.9 kg)     Temp Readings from Last 3 Encounters:   23 98.2 °F (36.8 °C) (Temporal)   23 97.6 °F (36.4 °C) (Skin)   22 97 °F (36.1 °C) (Skin)     BP Readings from Last 3 Encounters:   23 126/83   23 119/81   22 134/85     Pulse Readings from Last 3 Encounters:   23 100   23 (!) 113   22 (!) 106         Learning Assessment:  :     No flowsheet data found. Depression Screening:  :     3 most recent PHQ Screens 2023   Little interest or pleasure in doing things Nearly every day   Feeling down, depressed, irritable, or hopeless Nearly every day   Total Score PHQ 2 6   Trouble falling or staying asleep, or sleeping too much Not at all   Feeling tired or having little energy Nearly every day   Poor appetite, weight loss, or overeating Nearly every day   Feeling bad about yourself - or that you are a failure or have let yourself or your family down Nearly every day   Trouble concentrating on things such as school, work, reading, or watching TV Nearly every day   Moving or speaking so slowly that other people could have noticed; or the opposite being so fidgety that others notice Not at all   Thoughts of being better off dead, or hurting yourself in some way Not at all   PHQ 9 Score 18   How difficult have these problems made it for you to do your work, take care of your home and get along with others Extremely difficult       Fall Risk Assessment:  :     No flowsheet data found.     Abuse Screening:  :     Abuse Screening Questionnaire 1/19/2023 12/19/2022 8/16/2021   Do you ever feel afraid of your partner? N N N   Are you in a relationship with someone who physically or mentally threatens you? N N N   Is it safe for you to go home? Keily Kidney       Coordination of Care Questionnaire:  :   1. \"Have you been to the ER, urgent care clinic since your last visit? Hospitalized since your last visit? \" Yes Where: [facility] last week    Pinched nerve in neck & shoulder  pain    2. \"Have you seen or consulted any other health care providers outside of the [address] [place] [address] since your last visit? \" Yes Where: See above      3. For patients aged 39-70: Has the patient had a colonoscopy / FIT/ Cologuard? No      If the patient is female:    4. For patients aged 41-77: Has the patient had a mammogram within the past 2 years? No      5. For patients aged 21-65: Has the patient had a pap smear? No     3) Do you have an Advance Directive on file? no  Are you interested in receiving information about Advance Directives? no    Patient is accompanied by self I have received verbal consent from Tor Watts to discuss any/all medical information while they are present in the room.

## 2023-04-05 LAB — TSH SERPL DL<=0.05 MIU/L-ACNC: 3.07 UIU/ML (ref 0.36–3.74)

## 2023-05-01 ENCOUNTER — HOSPITAL ENCOUNTER (OUTPATIENT)
Dept: GENERAL RADIOLOGY | Age: 43
Discharge: HOME | End: 2023-05-01
Payer: COMMERCIAL

## 2023-05-01 ENCOUNTER — TRANSCRIBE ORDER (OUTPATIENT)
Dept: REGISTRATION | Age: 43
End: 2023-05-01

## 2023-05-01 DIAGNOSIS — M47.812 SPONDYLOSIS, CERVICAL: Primary | ICD-10-CM

## 2023-05-01 DIAGNOSIS — M47.812 SPONDYLOSIS, CERVICAL: ICD-10-CM

## 2023-05-01 PROCEDURE — 72050 X-RAY EXAM NECK SPINE 4/5VWS: CPT

## 2023-05-02 ENCOUNTER — OFFICE VISIT (OUTPATIENT)
Dept: FAMILY MEDICINE CLINIC | Age: 43
End: 2023-05-02
Payer: COMMERCIAL

## 2023-05-02 VITALS
HEART RATE: 100 BPM | DIASTOLIC BLOOD PRESSURE: 88 MMHG | OXYGEN SATURATION: 98 % | HEIGHT: 66 IN | WEIGHT: 226 LBS | BODY MASS INDEX: 36.32 KG/M2 | SYSTOLIC BLOOD PRESSURE: 132 MMHG | RESPIRATION RATE: 16 BRPM | TEMPERATURE: 97.8 F

## 2023-05-02 DIAGNOSIS — E74.39 GLUCOSE INTOLERANCE: ICD-10-CM

## 2023-05-02 DIAGNOSIS — Z13.220 LIPID SCREENING: ICD-10-CM

## 2023-05-02 DIAGNOSIS — F50.9 EATING DISORDER IN REMISSION: ICD-10-CM

## 2023-05-02 DIAGNOSIS — E66.9 OBESITY (BMI 30-39.9): Primary | ICD-10-CM

## 2023-05-02 PROBLEM — M54.50 CHRONIC LOW BACK PAIN: Status: ACTIVE | Noted: 2023-05-02

## 2023-05-02 PROBLEM — N80.03 ADENOMYOSIS OF THE UTERUS: Status: ACTIVE | Noted: 2023-05-02

## 2023-05-02 PROBLEM — G89.29 CHRONIC LOW BACK PAIN: Status: ACTIVE | Noted: 2023-05-02

## 2023-05-02 PROCEDURE — 99214 OFFICE O/P EST MOD 30 MIN: CPT | Performed by: STUDENT IN AN ORGANIZED HEALTH CARE EDUCATION/TRAINING PROGRAM

## 2023-05-02 RX ORDER — LURASIDONE HYDROCHLORIDE 40 MG/1
TABLET, FILM COATED ORAL
COMMUNITY

## 2023-05-02 RX ORDER — DICLOFENAC SODIUM 75 MG/1
75 TABLET, DELAYED RELEASE ORAL
COMMUNITY

## 2023-05-03 LAB
ALBUMIN SERPL-MCNC: 3.3 G/DL (ref 3.5–5)
ALBUMIN/GLOB SERPL: 1 (ref 1.1–2.2)
ALP SERPL-CCNC: 79 U/L (ref 45–117)
ALT SERPL-CCNC: 37 U/L (ref 12–78)
ANION GAP SERPL CALC-SCNC: 6 MMOL/L (ref 5–15)
AST SERPL-CCNC: 32 U/L (ref 15–37)
BILIRUB SERPL-MCNC: 0.1 MG/DL (ref 0.2–1)
BUN SERPL-MCNC: 11 MG/DL (ref 6–20)
BUN/CREAT SERPL: 11 (ref 12–20)
CALCIUM SERPL-MCNC: 8.7 MG/DL (ref 8.5–10.1)
CHLORIDE SERPL-SCNC: 111 MMOL/L (ref 97–108)
CHOLEST SERPL-MCNC: 146 MG/DL
CO2 SERPL-SCNC: 21 MMOL/L (ref 21–32)
CREAT SERPL-MCNC: 1.04 MG/DL (ref 0.55–1.02)
ERYTHROCYTE [DISTWIDTH] IN BLOOD BY AUTOMATED COUNT: 15.8 % (ref 11.5–14.5)
EST. AVERAGE GLUCOSE BLD GHB EST-MCNC: 120 MG/DL
GLOBULIN SER CALC-MCNC: 3.3 G/DL (ref 2–4)
GLUCOSE SERPL-MCNC: 117 MG/DL (ref 65–100)
HBA1C MFR BLD: 5.8 % (ref 4–5.6)
HCT VFR BLD AUTO: 41.8 % (ref 35–47)
HDLC SERPL-MCNC: 48 MG/DL
HDLC SERPL: 3 (ref 0–5)
HGB BLD-MCNC: 13 G/DL (ref 11.5–16)
LDLC SERPL CALC-MCNC: 56.8 MG/DL (ref 0–100)
MCH RBC QN AUTO: 26 PG (ref 26–34)
MCHC RBC AUTO-ENTMCNC: 31.1 G/DL (ref 30–36.5)
MCV RBC AUTO: 83.6 FL (ref 80–99)
NRBC # BLD: 0 K/UL (ref 0–0.01)
NRBC BLD-RTO: 0 PER 100 WBC
PLATELET # BLD AUTO: 335 K/UL (ref 150–400)
PMV BLD AUTO: 11 FL (ref 8.9–12.9)
POTASSIUM SERPL-SCNC: 4.1 MMOL/L (ref 3.5–5.1)
PROT SERPL-MCNC: 6.6 G/DL (ref 6.4–8.2)
RBC # BLD AUTO: 5 M/UL (ref 3.8–5.2)
SODIUM SERPL-SCNC: 138 MMOL/L (ref 136–145)
TRIGL SERPL-MCNC: 206 MG/DL (ref ?–150)
VLDLC SERPL CALC-MCNC: 41.2 MG/DL
WBC # BLD AUTO: 8.2 K/UL (ref 3.6–11)

## 2023-05-04 PROBLEM — R73.03 PREDIABETES: Status: ACTIVE | Noted: 2023-05-04

## 2023-05-04 PROBLEM — E78.1 HYPERTRIGLYCERIDEMIA: Status: ACTIVE | Noted: 2023-05-04

## 2023-09-30 ENCOUNTER — HOSPITAL ENCOUNTER (OUTPATIENT)
Facility: HOSPITAL | Age: 43
Discharge: HOME | End: 2023-09-30
Attending: INTERNAL MEDICINE
Payer: COMMERCIAL

## 2023-09-30 DIAGNOSIS — R10.816 EPIGASTRIC ABDOMINAL TENDERNESS, REBOUND TENDERNESS PRESENCE NOT SPECIFIED: ICD-10-CM

## 2023-09-30 DIAGNOSIS — R11.0 NAUSEA: ICD-10-CM

## 2023-09-30 PROCEDURE — 76705 ECHO EXAM OF ABDOMEN: CPT

## 2023-11-14 ENCOUNTER — TELEPHONE (OUTPATIENT)
Facility: CLINIC | Age: 43
End: 2023-11-14

## 2023-11-14 NOTE — TELEPHONE ENCOUNTER
Recent and upcoming appointments:   Last visit with clinic:  5/2/2023   Next visit with clinic: Visit date not found     Last visit with this provider: 5/2/2023   Next Visit with this provider: Visit date not found

## 2023-11-14 NOTE — TELEPHONE ENCOUNTER
----- Message from Camilla Bhardwaj sent at 11/14/2023 11:31 AM EST -----  Subject: Message to Provider    QUESTIONS  Information for Provider? Pt is calling in wanting to know if it is time   for her to schedule an appt. If the practice could please give the pt a   call back to advise. The appt is in regards to her hypo Thyroids. ---------------------------------------------------------------------------  --------------  Lonnie Esposito [facility]  [phone]; OK to leave message on voicemail  ---------------------------------------------------------------------------  --------------  SCRIPT ANSWERS  Relationship to Patient?  Self

## 2023-11-28 ENCOUNTER — TELEMEDICINE (OUTPATIENT)
Facility: CLINIC | Age: 43
End: 2023-11-28
Payer: COMMERCIAL

## 2023-11-28 DIAGNOSIS — R73.03 PREDIABETES: ICD-10-CM

## 2023-11-28 DIAGNOSIS — K76.0 FATTY LIVER: ICD-10-CM

## 2023-11-28 DIAGNOSIS — E03.8 SUBCLINICAL HYPOTHYROIDISM: Primary | ICD-10-CM

## 2023-11-28 DIAGNOSIS — E03.8 SUBCLINICAL HYPOTHYROIDISM: ICD-10-CM

## 2023-11-28 PROCEDURE — 99214 OFFICE O/P EST MOD 30 MIN: CPT | Performed by: STUDENT IN AN ORGANIZED HEALTH CARE EDUCATION/TRAINING PROGRAM

## 2023-11-28 RX ORDER — BUSPIRONE HYDROCHLORIDE 30 MG/1
30 TABLET ORAL 2 TIMES DAILY
COMMUNITY
Start: 2022-05-30

## 2023-11-28 RX ORDER — CETIRIZINE HYDROCHLORIDE 10 MG/1
TABLET ORAL
COMMUNITY
Start: 2021-06-08

## 2023-11-28 RX ORDER — CYCLOPENTOLATE HYDROCHLORIDE 10 MG/ML
SOLUTION/ DROPS OPHTHALMIC
COMMUNITY
Start: 2023-11-20

## 2023-11-28 RX ORDER — FAMOTIDINE 20 MG/1
20 TABLET, FILM COATED ORAL 2 TIMES DAILY
COMMUNITY
Start: 2023-11-04

## 2023-11-28 RX ORDER — GABAPENTIN 600 MG/1
1800 TABLET ORAL NIGHTLY
COMMUNITY
Start: 2023-11-04

## 2023-11-28 RX ORDER — PRAZOSIN HYDROCHLORIDE 2 MG/1
CAPSULE ORAL
COMMUNITY
Start: 2023-11-08

## 2023-11-28 RX ORDER — ONDANSETRON 4 MG/1
8 TABLET, ORALLY DISINTEGRATING ORAL EVERY 12 HOURS PRN
COMMUNITY
Start: 2023-09-13

## 2023-11-28 RX ORDER — MIRTAZAPINE 45 MG/1
45 TABLET, FILM COATED ORAL DAILY
COMMUNITY
Start: 2023-11-08

## 2023-11-28 RX ORDER — PROPRANOLOL HYDROCHLORIDE 10 MG/1
10 TABLET ORAL NIGHTLY
COMMUNITY
Start: 2023-11-10

## 2023-11-28 RX ORDER — ESZOPICLONE 3 MG/1
1 TABLET, FILM COATED ORAL NIGHTLY
COMMUNITY
Start: 2022-11-02

## 2023-11-28 ASSESSMENT — ENCOUNTER SYMPTOMS
VOMITING: 0
RHINORRHEA: 0
ABDOMINAL PAIN: 0
SHORTNESS OF BREATH: 0
COUGH: 0
NAUSEA: 1

## 2023-11-28 NOTE — PROGRESS NOTES
Identified pt with two pt identifiers(name and ). Reviewed record in preparation for visit and have obtained necessary documentation. Chief Complaint   Patient presents with    discuss medication      Thyroid         Health Maintenance Due   Topic    Hepatitis B vaccine (1 of 3 - 3-dose series)    Pneumococcal 0-64 years Vaccine (1 - PCV)    HIV screen     Hepatitis C screen     DTaP/Tdap/Td vaccine (1 - Tdap)    Cervical cancer screen     Flu vaccine (1)    COVID-19 Vaccine (3 - 2023-24 season)       Coordination of Care Questionnaire:  :   1) Have you been to an emergency room, urgent care, or hospitalized since your last visit? If yes, where when, and reason for visit? no      2. Have seen or consulted any other health care provider since your last visit? If yes, where when, and reason for visit? Yes, [facility]        Patient is accompanied by self I have received verbal consent from Satish Rodriguez to discuss any/all medical information while they are present in the room.
respiratory distress        [] Abnormal -      Musculoskeletal:   [] Normal gait with no signs of ataxia         [] Normal range of motion of neck        [] Abnormal -     Neurological:        [x] No Facial Asymmetry (Cranial nerve 7 motor function) (limited exam due to video visit)          [x] No gaze palsy        [] Abnormal -          Skin:        [x] No significant exanthematous lesions or discoloration noted on facial skin         [] Abnormal -            Psychiatric:       [x] Normal Affect [] Abnormal -        [x] No Hallucinations    Other pertinent observable physical exam findings:-         --Zunilda Viera MD

## 2023-11-29 LAB
ALBUMIN SERPL-MCNC: 3.5 G/DL (ref 3.5–5)
ALBUMIN/GLOB SERPL: 1.1 (ref 1.1–2.2)
ALP SERPL-CCNC: 72 U/L (ref 45–117)
ALT SERPL-CCNC: 38 U/L (ref 12–78)
ANION GAP SERPL CALC-SCNC: 6 MMOL/L (ref 5–15)
AST SERPL-CCNC: 28 U/L (ref 15–37)
BILIRUB DIRECT SERPL-MCNC: <0.1 MG/DL (ref 0–0.2)
BILIRUB SERPL-MCNC: 0.2 MG/DL (ref 0.2–1)
BUN SERPL-MCNC: 14 MG/DL (ref 6–20)
BUN/CREAT SERPL: 13 (ref 12–20)
CALCIUM SERPL-MCNC: 9.4 MG/DL (ref 8.5–10.1)
CHLORIDE SERPL-SCNC: 108 MMOL/L (ref 97–108)
CO2 SERPL-SCNC: 25 MMOL/L (ref 21–32)
CREAT SERPL-MCNC: 1.07 MG/DL (ref 0.55–1.02)
EST. AVERAGE GLUCOSE BLD GHB EST-MCNC: 108 MG/DL
GLOBULIN SER CALC-MCNC: 3.2 G/DL (ref 2–4)
GLUCOSE SERPL-MCNC: 82 MG/DL (ref 65–100)
HBA1C MFR BLD: 5.4 % (ref 4–5.6)
POTASSIUM SERPL-SCNC: 4.4 MMOL/L (ref 3.5–5.1)
PROT SERPL-MCNC: 6.7 G/DL (ref 6.4–8.2)
SODIUM SERPL-SCNC: 139 MMOL/L (ref 136–145)
TSH SERPL DL<=0.05 MIU/L-ACNC: 2.05 UIU/ML (ref 0.36–3.74)

## 2023-11-29 RX ORDER — LEVOTHYROXINE SODIUM 0.03 MG/1
25 TABLET ORAL
Qty: 90 TABLET | Refills: 1 | Status: SHIPPED | OUTPATIENT
Start: 2023-11-29 | End: 2024-01-02 | Stop reason: SDUPTHER

## 2023-11-29 NOTE — TELEPHONE ENCOUNTER
PCP: Caro Sheffield MD    Last appt: [unfilled]  No future appointments.    Requested Prescriptions     Pending Prescriptions Disp Refills    levothyroxine (SYNTHROID) 25 MCG tablet 90 tablet 1     Sig: Take 1 tablet by mouth every morning (before breakfast)       Prior labs and Blood pressures:  BP Readings from Last 3 Encounters:   05/02/23 132/88   04/04/23 126/83   01/19/23 119/81     Lab Results   Component Value Date/Time     05/02/2023 02:40 PM    K 4.1 05/02/2023 02:40 PM     05/02/2023 02:40 PM    CO2 21 05/02/2023 02:40 PM    BUN 11 05/02/2023 02:40 PM    GFRAA >60 01/05/2021 02:41 PM     No results found for: \"HBA1C\", \"TKL2OEPD\"  Lab Results   Component Value Date/Time    CHOL 146 05/02/2023 02:40 PM    HDL 48 05/02/2023 02:40 PM     No results found for: \"VITD3\", \"VD3RIA\"    Lab Results   Component Value Date/Time    TSH 3.07 04/04/2023 01:53 PM

## 2024-01-03 NOTE — TELEPHONE ENCOUNTER
PCP: Caro Sheffield MD    Last appt: [unfilled]  No future appointments.    Requested Prescriptions     Pending Prescriptions Disp Refills    levothyroxine (SYNTHROID) 25 MCG tablet 90 tablet 1     Sig: Take 1 tablet by mouth every morning (before breakfast)       Prior labs and Blood pressures:  BP Readings from Last 3 Encounters:   05/02/23 132/88   04/04/23 126/83   01/19/23 119/81     Lab Results   Component Value Date/Time     11/28/2023 03:53 PM    K 4.4 11/28/2023 03:53 PM     11/28/2023 03:53 PM    CO2 25 11/28/2023 03:53 PM    BUN 14 11/28/2023 03:53 PM    GFRAA >60 01/05/2021 02:41 PM     No results found for: \"HBA1C\", \"ROQ1VHLQ\"  Lab Results   Component Value Date/Time    CHOL 146 05/02/2023 02:40 PM    HDL 48 05/02/2023 02:40 PM     No results found for: \"VITD3\", \"VD3RIA\"    Lab Results   Component Value Date/Time    TSH 3.07 04/04/2023 01:53 PM

## 2024-01-04 RX ORDER — LEVOTHYROXINE SODIUM 0.03 MG/1
25 TABLET ORAL
Qty: 90 TABLET | Refills: 1 | Status: SHIPPED | OUTPATIENT
Start: 2024-01-04

## 2024-03-20 ENCOUNTER — TELEPHONE (OUTPATIENT)
Facility: CLINIC | Age: 44
End: 2024-03-20

## 2024-03-20 NOTE — TELEPHONE ENCOUNTER
Patient states she has had vertigo for years , would like to see her PCP to discuss.     Would rather see her PCP then to go to urgent care, transferred to the  to make a appt

## 2024-03-22 ENCOUNTER — OFFICE VISIT (OUTPATIENT)
Facility: CLINIC | Age: 44
End: 2024-03-22
Payer: COMMERCIAL

## 2024-03-22 VITALS
HEART RATE: 91 BPM | HEIGHT: 66 IN | TEMPERATURE: 98.5 F | DIASTOLIC BLOOD PRESSURE: 83 MMHG | SYSTOLIC BLOOD PRESSURE: 120 MMHG | RESPIRATION RATE: 14 BRPM | BODY MASS INDEX: 36.48 KG/M2 | OXYGEN SATURATION: 97 %

## 2024-03-22 DIAGNOSIS — H81.13 BENIGN PAROXYSMAL POSITIONAL VERTIGO DUE TO BILATERAL VESTIBULAR DISORDER: Primary | ICD-10-CM

## 2024-03-22 PROCEDURE — 99213 OFFICE O/P EST LOW 20 MIN: CPT | Performed by: STUDENT IN AN ORGANIZED HEALTH CARE EDUCATION/TRAINING PROGRAM

## 2024-03-22 RX ORDER — DESVENLAFAXINE SUCCINATE 50 MG
50 TABLET, EXTENDED RELEASE 24 HR ORAL DAILY
COMMUNITY
Start: 2024-01-23

## 2024-03-22 RX ORDER — DESVENLAFAXINE SUCCINATE 50 MG/1
100 TABLET, EXTENDED RELEASE ORAL DAILY
COMMUNITY
End: 2024-03-22

## 2024-03-22 RX ORDER — ESKETAMINE HYDROCHLORIDE 28 MG/.2ML
84 SOLUTION NASAL WEEKLY
COMMUNITY
Start: 2024-03-20

## 2024-03-22 ASSESSMENT — PATIENT HEALTH QUESTIONNAIRE - PHQ9
10. IF YOU CHECKED OFF ANY PROBLEMS, HOW DIFFICULT HAVE THESE PROBLEMS MADE IT FOR YOU TO DO YOUR WORK, TAKE CARE OF THINGS AT HOME, OR GET ALONG WITH OTHER PEOPLE: EXTREMELY DIFFICULT
SUM OF ALL RESPONSES TO PHQ QUESTIONS 1-9: 9
SUM OF ALL RESPONSES TO PHQ QUESTIONS 1-9: 9
5. POOR APPETITE OR OVEREATING: NEARLY EVERY DAY
1. LITTLE INTEREST OR PLEASURE IN DOING THINGS: NOT AT ALL
7. TROUBLE CONCENTRATING ON THINGS, SUCH AS READING THE NEWSPAPER OR WATCHING TELEVISION: NOT AT ALL
6. FEELING BAD ABOUT YOURSELF - OR THAT YOU ARE A FAILURE OR HAVE LET YOURSELF OR YOUR FAMILY DOWN: NOT AT ALL
8. MOVING OR SPEAKING SO SLOWLY THAT OTHER PEOPLE COULD HAVE NOTICED. OR THE OPPOSITE, BEING SO FIGETY OR RESTLESS THAT YOU HAVE BEEN MOVING AROUND A LOT MORE THAN USUAL: NOT AT ALL
SUM OF ALL RESPONSES TO PHQ QUESTIONS 1-9: 9
3. TROUBLE FALLING OR STAYING ASLEEP: NEARLY EVERY DAY
9. THOUGHTS THAT YOU WOULD BE BETTER OFF DEAD, OR OF HURTING YOURSELF: NOT AT ALL
SUM OF ALL RESPONSES TO PHQ9 QUESTIONS 1 & 2: 0
4. FEELING TIRED OR HAVING LITTLE ENERGY: NEARLY EVERY DAY
2. FEELING DOWN, DEPRESSED OR HOPELESS: NOT AT ALL
SUM OF ALL RESPONSES TO PHQ QUESTIONS 1-9: 9

## 2024-03-22 ASSESSMENT — ENCOUNTER SYMPTOMS
RHINORRHEA: 0
NAUSEA: 1
VOMITING: 0
SHORTNESS OF BREATH: 0
COUGH: 0
ABDOMINAL PAIN: 0

## 2024-03-22 NOTE — PROGRESS NOTES
Assessment/Plan:     Diagnoses and all orders for this visit:    Benign paroxysmal positional vertigo due to bilateral vestibular disorder  -Symptoms and history consistent with BPPV  -Positive Yara-Hallpike maneuver noted in office today  -Epley maneuver performed  -Patient also given home Epley maneuvers to perform if needed  -Advised patient that should symptoms fail to improve with this maneuver I do recommend reevaluation      Return if symptoms worsen or fail to improve.     Discussed expected course/resolution/complications of diagnosis in detail with patient.    Medication risks/benefits/costs/interactions/alternatives discussed with patient.    Pt expressed understanding with the diagnosis and plan      Subjective:      Mica Roberto is a 43 y.o. female who presents for had concerns including Dizziness (For a couple of months with nausea) and Blood Pressure Check.     Current Outpatient Medications   Medication Sig Dispense Refill    PRISTIQ 50 MG TB24 extended release tablet Take 1 tablet by mouth daily      SPRAVATO, 84 MG DOSE, 28 MG/DEVICE SOPK nasal solution 6 sprays by Nasal route once a week      levothyroxine (SYNTHROID) 25 MCG tablet Take 1 tablet by mouth every morning (before breakfast) 90 tablet 1    busPIRone (BUSPAR) 30 MG tablet Take 30 mg by mouth 2 times daily      cetirizine (ZYRTEC) 10 MG tablet 10 mg = 1 tab each dose, PO, daily, # 30 tab, 0 Refills      cyclopentolate (CYCLOGYL) 1 % ophthalmic solution INSTIL 1 DROP INTO AFFECTED EYE OPHTHALMIC AS NEEDED      eszopiclone (LUNESTA) 3 MG TABS Take 1 tablet by mouth nightly.      famotidine (PEPCID) 20 MG tablet Take 1 tablet by mouth 2 times daily      FOLIC ACID PO Take 1 tablet by mouth Daily      gabapentin (NEURONTIN) 600 MG tablet Take 3 tablets by mouth nightly.      ondansetron (ZOFRAN-ODT) 4 MG disintegrating tablet Take 2 tablets by mouth every 12 hours as needed for Vomiting or Nausea      prazosin (MINIPRESS) 2 MG capsule TAKE 1

## 2024-03-22 NOTE — PROGRESS NOTES
Mica Roberto is a 43 y.o. female presenting for Dizziness (For a couple of months with nausea) and Blood Pressure Check      /83 (Site: Left Upper Arm, Position: Sitting, Cuff Size: Large Adult)   Pulse 91   Temp 98.5 °F (36.9 °C) (Oral)   Resp 14   Ht 1.676 m (5' 6\")   SpO2 97%   BMI 36.48 kg/m²       Current Outpatient Medications   Medication Sig Dispense Refill    PRISTIQ 50 MG TB24 extended release tablet       SPRAVATO, 84 MG DOSE, 28 MG/DEVICE SOPK nasal solution       levothyroxine (SYNTHROID) 25 MCG tablet Take 1 tablet by mouth every morning (before breakfast) 90 tablet 1    busPIRone (BUSPAR) 30 MG tablet Take 30 mg by mouth 2 times daily      cetirizine (ZYRTEC) 10 MG tablet 10 mg = 1 tab each dose, PO, daily, # 30 tab, 0 Refills      cyclopentolate (CYCLOGYL) 1 % ophthalmic solution INSTIL 1 DROP INTO AFFECTED EYE OPHTHALMIC AS NEEDED      eszopiclone (LUNESTA) 3 MG TABS Take 1 tablet by mouth nightly.      famotidine (PEPCID) 20 MG tablet Take 1 tablet by mouth 2 times daily      FOLIC ACID PO Take 1 tablet by mouth Daily      gabapentin (NEURONTIN) 600 MG tablet Take 3 tablets by mouth nightly.      ondansetron (ZOFRAN-ODT) 4 MG disintegrating tablet Take 2 tablets by mouth every 12 hours as needed for Vomiting or Nausea      prazosin (MINIPRESS) 2 MG capsule TAKE 1 CAPSULE BY MOUTH ONCE DAILY AT BEDTIME      propranolol (INDERAL) 10 MG tablet Take 1 tablet by mouth at bedtime      albuterol sulfate HFA (PROVENTIL;VENTOLIN;PROAIR) 108 (90 Base) MCG/ACT inhaler Inhale 2 puffs into the lungs every 6 hours as needed      montelukast (SINGULAIR) 10 MG tablet Take 1 tablet by mouth nightly      norethindrone-ethinyl estradiol (ORTHO-NOVUM 7/7/7) 0.5/0.75/1-35 MG-MCG per tablet Take 1 tablet by mouth daily      mirtazapine (REMERON) 45 MG tablet Take 1 tablet by mouth daily       No current facility-administered medications for this visit.        1. \"Have you been to the ER, urgent care clinic

## 2024-05-07 SDOH — ECONOMIC STABILITY: INCOME INSECURITY: HOW HARD IS IT FOR YOU TO PAY FOR THE VERY BASICS LIKE FOOD, HOUSING, MEDICAL CARE, AND HEATING?: NOT HARD AT ALL

## 2024-05-07 SDOH — ECONOMIC STABILITY: HOUSING INSECURITY
IN THE LAST 12 MONTHS, WAS THERE A TIME WHEN YOU DID NOT HAVE A STEADY PLACE TO SLEEP OR SLEPT IN A SHELTER (INCLUDING NOW)?: NO

## 2024-05-07 SDOH — ECONOMIC STABILITY: TRANSPORTATION INSECURITY
IN THE PAST 12 MONTHS, HAS LACK OF TRANSPORTATION KEPT YOU FROM MEETINGS, WORK, OR FROM GETTING THINGS NEEDED FOR DAILY LIVING?: NO

## 2024-05-07 SDOH — ECONOMIC STABILITY: FOOD INSECURITY: WITHIN THE PAST 12 MONTHS, THE FOOD YOU BOUGHT JUST DIDN'T LAST AND YOU DIDN'T HAVE MONEY TO GET MORE.: NEVER TRUE

## 2024-05-07 SDOH — ECONOMIC STABILITY: FOOD INSECURITY: WITHIN THE PAST 12 MONTHS, YOU WORRIED THAT YOUR FOOD WOULD RUN OUT BEFORE YOU GOT MONEY TO BUY MORE.: NEVER TRUE

## 2024-05-08 ENCOUNTER — OFFICE VISIT (OUTPATIENT)
Facility: CLINIC | Age: 44
End: 2024-05-08
Payer: COMMERCIAL

## 2024-05-08 VITALS
HEART RATE: 82 BPM | BODY MASS INDEX: 36.48 KG/M2 | SYSTOLIC BLOOD PRESSURE: 100 MMHG | DIASTOLIC BLOOD PRESSURE: 71 MMHG | TEMPERATURE: 98.4 F | HEIGHT: 66 IN | OXYGEN SATURATION: 97 % | RESPIRATION RATE: 18 BRPM

## 2024-05-08 DIAGNOSIS — L65.9 HAIR LOSS: ICD-10-CM

## 2024-05-08 DIAGNOSIS — R53.83 OTHER FATIGUE: ICD-10-CM

## 2024-05-08 DIAGNOSIS — E03.8 SUBCLINICAL HYPOTHYROIDISM: Primary | ICD-10-CM

## 2024-05-08 PROCEDURE — 99214 OFFICE O/P EST MOD 30 MIN: CPT | Performed by: STUDENT IN AN ORGANIZED HEALTH CARE EDUCATION/TRAINING PROGRAM

## 2024-05-08 ASSESSMENT — ENCOUNTER SYMPTOMS
ABDOMINAL PAIN: 0
VOMITING: 0
RHINORRHEA: 0
SHORTNESS OF BREATH: 0
COUGH: 0
NAUSEA: 0

## 2024-05-08 NOTE — PROGRESS NOTES
Room 12    I have reviewed all needed preparation for visit and have obtained necessary documentation. Identified pt with two pt identifiers (name and ). All patient medications has been reviewed.    Chief Complaint   Patient presents with    Thyroid Problem       Vitals:    24 0835   BP: 100/71   Site: Left Upper Arm   Position: Sitting   Cuff Size: Large Adult   Pulse: 82   Resp: 18   Temp: 98.4 °F (36.9 °C)   TempSrc: Oral   SpO2: 97%   Weight: Comment: pt refused weight   Height: 1.676 m (5' 6\")        Pain Score:   0 - No pain          3/22/2024    10:45 AM   PHQ-9    Little interest or pleasure in doing things 0   Feeling down, depressed, or hopeless 0   Trouble falling or staying asleep, or sleeping too much 3   Feeling tired or having little energy 3   Poor appetite or overeating 3   Feeling bad about yourself - or that you are a failure or have let yourself or your family down 0   Trouble concentrating on things, such as reading the newspaper or watching television 0   Moving or speaking so slowly that other people could have noticed. Or the opposite - being so fidgety or restless that you have been moving around a lot more than usual 0   Thoughts that you would be better off dead, or of hurting yourself in some way 0   PHQ-2 Score 0   PHQ-9 Total Score 9   If you checked off any problems, how difficult have these problems made it for you to do your work, take care of things at home, or get along with other people? 3            2023     1:00 PM   Amb Fall Risk Assessment and TUG Test   Fall in past 12 months? 0   Able to walk? Yes        Health Maintenance Due   Topic Date Due    Hepatitis B vaccine (1 of 3 - 3-dose series) Never done    Pneumococcal 0-64 years Vaccine (1 of 2 - PCV) Never done    HIV screen  Never done    Hepatitis C screen  Never done    DTaP/Tdap/Td vaccine (1 - Tdap) Never done    Cervical cancer screen  Never done    COVID-19 Vaccine (3 - 2023- season) 2023

## 2024-05-08 NOTE — PROGRESS NOTES
Assessment/Plan:     Diagnoses and all orders for this visit:    Subclinical hypothyroidism  -     TSH; Future  -     T4, Free; Future  -     External Referral To Endocrinology  -Patient has had recent symptoms of hair loss and difficulty with temperature change  -Currently on levothyroxine 25 mcg daily  -Repeat TSH along with free T4  -Patient also desires referral to endocrinology.  Referral placed today    Hair loss  -     TSH; Future  -     Iron and TIBC; Future  -     Ferritin; Future  -     External Referral To Endocrinology  -History of 4 to 6 months of generalized hair loss  -Check a TSH level along with iron and ferritin  -Patient desires referral to endocrinology.  Referral placed today    Other fatigue  -     TSH; Future  -     Vitamin D 25 Hydroxy; Future  -     Iron and TIBC; Future  -     Ferritin; Future  -     External Referral To Endocrinology  -Chronic.  Noted for years.  -Check basic lab work.  -Patient also states she has already had a sleep study completed in the past that was unremarkable.         Return if symptoms worsen or fail to improve.     Discussed expected course/resolution/complications of diagnosis in detail with patient.    Medication risks/benefits/costs/interactions/alternatives discussed with patient.    Pt expressed understanding with the diagnosis and plan      Subjective:      Mica Roberto is a 44 y.o. female who presents for had concerns including Thyroid Problem.     Current Outpatient Medications   Medication Sig Dispense Refill    PRISTIQ 50 MG TB24 extended release tablet Take 1 tablet by mouth daily      SPRAVATO, 84 MG DOSE, 28 MG/DEVICE SOPK nasal solution 4 sprays by Nasal route once a week      levothyroxine (SYNTHROID) 25 MCG tablet Take 1 tablet by mouth every morning (before breakfast) 90 tablet 1    busPIRone (BUSPAR) 30 MG tablet Take 30 mg by mouth 2 times daily      cetirizine (ZYRTEC) 10 MG tablet 10 mg = 1 tab each dose, PO, daily, # 30 tab, 0 Refills

## 2024-05-09 LAB
25(OH)D3 SERPL-MCNC: 57.2 NG/ML (ref 30–100)
FERRITIN SERPL-MCNC: 27 NG/ML (ref 26–388)
IRON SATN MFR SERPL: 14 % (ref 20–50)
IRON SERPL-MCNC: 62 UG/DL (ref 35–150)
T4 FREE SERPL-MCNC: 1 NG/DL (ref 0.8–1.5)
TIBC SERPL-MCNC: 428 UG/DL (ref 250–450)
TSH SERPL DL<=0.05 MIU/L-ACNC: 1.71 UIU/ML (ref 0.36–3.74)

## 2024-05-10 DIAGNOSIS — L65.9 HAIR LOSS: Primary | ICD-10-CM

## 2024-05-10 DIAGNOSIS — E03.8 SUBCLINICAL HYPOTHYROIDISM: ICD-10-CM

## 2024-05-17 DIAGNOSIS — E61.1 IRON DEFICIENCY: Primary | ICD-10-CM

## 2024-05-22 ENCOUNTER — TELEPHONE (OUTPATIENT)
Age: 44
End: 2024-05-22

## 2024-05-22 NOTE — TELEPHONE ENCOUNTER
Patient called to check the status on her referral. Was advised to inform the pt that her medical records/ referral was received. Once reviewed by the [facility] team. Pt will receive a call from the referral coordinator to schedule an appt.      # [phone]

## 2024-05-30 ENCOUNTER — OFFICE VISIT (OUTPATIENT)
Age: 44
End: 2024-05-30
Payer: COMMERCIAL

## 2024-05-30 VITALS
BODY MASS INDEX: 36.48 KG/M2 | RESPIRATION RATE: 16 BRPM | SYSTOLIC BLOOD PRESSURE: 137 MMHG | TEMPERATURE: 98.1 F | HEART RATE: 74 BPM | OXYGEN SATURATION: 95 % | DIASTOLIC BLOOD PRESSURE: 87 MMHG | HEIGHT: 66 IN

## 2024-05-30 DIAGNOSIS — R53.83 OTHER FATIGUE: ICD-10-CM

## 2024-05-30 DIAGNOSIS — Z80.0 FAMILY HISTORY OF COLON CANCER: ICD-10-CM

## 2024-05-30 DIAGNOSIS — K75.81 METABOLIC DYSFUNCTION-ASSOCIATED STEATOHEPATITIS (MASH): ICD-10-CM

## 2024-05-30 DIAGNOSIS — E61.1 IRON DEFICIENCY: ICD-10-CM

## 2024-05-30 DIAGNOSIS — E61.1 IRON DEFICIENCY: Primary | ICD-10-CM

## 2024-05-30 DIAGNOSIS — E53.8 VITAMIN B12 DEFICIENCY: ICD-10-CM

## 2024-05-30 PROCEDURE — 99204 OFFICE O/P NEW MOD 45 MIN: CPT | Performed by: NURSE PRACTITIONER

## 2024-05-30 ASSESSMENT — PATIENT HEALTH QUESTIONNAIRE - PHQ9
SUM OF ALL RESPONSES TO PHQ QUESTIONS 1-9: 2
2. FEELING DOWN, DEPRESSED OR HOPELESS: SEVERAL DAYS
SUM OF ALL RESPONSES TO PHQ QUESTIONS 1-9: 2
SUM OF ALL RESPONSES TO PHQ9 QUESTIONS 1 & 2: 2
1. LITTLE INTEREST OR PLEASURE IN DOING THINGS: SEVERAL DAYS

## 2024-05-31 ENCOUNTER — TELEPHONE (OUTPATIENT)
Age: 44
End: 2024-05-31

## 2024-05-31 DIAGNOSIS — E53.8 VITAMIN B12 DEFICIENCY: Primary | ICD-10-CM

## 2024-05-31 LAB
ALBUMIN SERPL-MCNC: 3.3 G/DL (ref 3.5–5)
ALBUMIN/GLOB SERPL: 0.9 (ref 1.1–2.2)
ALP SERPL-CCNC: 70 U/L (ref 45–117)
ALT SERPL-CCNC: 28 U/L (ref 12–78)
ANION GAP SERPL CALC-SCNC: 4 MMOL/L (ref 5–15)
AST SERPL-CCNC: 26 U/L (ref 15–37)
BASOPHILS # BLD: 0.1 K/UL (ref 0–0.1)
BASOPHILS NFR BLD: 1 % (ref 0–1)
BILIRUB SERPL-MCNC: 0.3 MG/DL (ref 0.2–1)
BUN SERPL-MCNC: 10 MG/DL (ref 6–20)
BUN/CREAT SERPL: 11 (ref 12–20)
CALCIUM SERPL-MCNC: 9.2 MG/DL (ref 8.5–10.1)
CHLORIDE SERPL-SCNC: 109 MMOL/L (ref 97–108)
CO2 SERPL-SCNC: 23 MMOL/L (ref 21–32)
CREAT SERPL-MCNC: 0.89 MG/DL (ref 0.55–1.02)
DIFFERENTIAL METHOD BLD: NORMAL
EOSINOPHIL # BLD: 0.2 K/UL (ref 0–0.4)
EOSINOPHIL NFR BLD: 2 % (ref 0–7)
ERYTHROCYTE [DISTWIDTH] IN BLOOD BY AUTOMATED COUNT: 13.9 % (ref 11.5–14.5)
FERRITIN SERPL-MCNC: 33 NG/ML (ref 8–252)
FOLATE SERPL-MCNC: 25.8 NG/ML (ref 5–21)
GLOBULIN SER CALC-MCNC: 3.5 G/DL (ref 2–4)
GLUCOSE SERPL-MCNC: 88 MG/DL (ref 65–100)
HCT VFR BLD AUTO: 40.9 % (ref 35–47)
HGB BLD-MCNC: 13.7 G/DL (ref 11.5–16)
IMM GRANULOCYTES # BLD AUTO: 0 K/UL (ref 0–0.04)
IMM GRANULOCYTES NFR BLD AUTO: 0 % (ref 0–0.5)
IRON SATN MFR SERPL: 12 % (ref 20–50)
IRON SERPL-MCNC: 55 UG/DL (ref 35–150)
LYMPHOCYTES # BLD: 3 K/UL (ref 0.8–3.5)
LYMPHOCYTES NFR BLD: 32 % (ref 12–49)
MCH RBC QN AUTO: 27.2 PG (ref 26–34)
MCHC RBC AUTO-ENTMCNC: 33.5 G/DL (ref 30–36.5)
MCV RBC AUTO: 81.2 FL (ref 80–99)
MONOCYTES # BLD: 0.5 K/UL (ref 0–1)
MONOCYTES NFR BLD: 5 % (ref 5–13)
NEUTS SEG # BLD: 5.6 K/UL (ref 1.8–8)
NEUTS SEG NFR BLD: 60 % (ref 32–75)
NRBC # BLD: 0 K/UL (ref 0–0.01)
NRBC BLD-RTO: 0 PER 100 WBC
PLATELET # BLD AUTO: 327 K/UL (ref 150–400)
PMV BLD AUTO: 10.5 FL (ref 8.9–12.9)
PROT SERPL-MCNC: 6.8 G/DL (ref 6.4–8.2)
SODIUM SERPL-SCNC: 136 MMOL/L (ref 136–145)
TIBC SERPL-MCNC: 472 UG/DL (ref 250–450)
VIT B12 SERPL-MCNC: 239 PG/ML (ref 193–986)
WBC # BLD AUTO: 9.5 K/UL (ref 3.6–11)

## 2024-05-31 NOTE — TELEPHONE ENCOUNTER
05/31/24 4:27 PM Called pt and reviewed labs. Mild iron deficiency with low iron saturation. Ferritin normal, no anemia. B12 mildly low. Advised starting on B12 supplement. Do not recommend parenteral iron infusions for a mildly low iron saturation. Will avoid iron supplements due to GI upset. Gastroparesis may be contributing to malabsorption of iron intake from foods. Reviewed iron rich diet. If iron deficiency worsens, she may need to return to GI for repeat EGD/colonoscopy. She verbalized understanding.     Advised checking MMA. She agreed. Placed orders.

## 2024-05-31 NOTE — PROGRESS NOTES
Chief Complaint   Patient presents with    New Patient           Vitals:    05/30/24 1514   BP: 137/87   Pulse: 74   Resp: 16   Temp: 98.1 °F (36.7 °C)   SpO2: 95%            1. Have you been to the ER, urgent care clinic since your last visit?  Hospitalized since your last visit?  New Patient  2. Have you seen or consulted any other health care providers outside of the [facility] since your last visit?  Include any pap smears or colon screening. New Patient      
[facility]  Medical Oncology at [place]  [phone]    Hematology / Oncology Consult    Reason for Visit:   Mica Roberto is a 44 y.o. female who is seen in consultation at the request of Dr. Caro Sheffield for evaluation of anemia.    History of Present Illness:   Mica Roberto is a 44 y.o. female who is here for evaluation of anemia. Medical history significant for subclinical  hypothyroidism, hair loss, fatigue, depression and MASH. Denies history of a gastric bypass. Reports in 2019 she was following with a hematologist for anemia at [facility] in [place]. Reports her ferritin was 5 at the time and she received parenteral iron infusions. She is not currently taking iron supplements due to GI upset including nausea and constipation. Denies dark tarry stools. Reports she will notice bright red blood in the toilet bowel several times a year. Denies constipation, straining or history of hemorrhoids.   Reports she had a normal colonoscopy in May 2023 with Dr. Flaquito Roberts. She also had an endoscopy in 2023 to further workup gastroparesis. She reports also having a gastric emptying study. Reports she is on birth control and has not had a menstrual cycle since 1998. Her GYN started her on birth control due to uterine adenomyosis. Reports she is participating in an observational clinical trial for her liver disease. Reports she has metabolic dysfunction-associated steatohepatitis (MASH) with fibrosis stage 1. She is following with Dr. Kan for the clinical trial. She is also following with a hepatologist. Reports severe fatigue. She is on levothyroxine for subclinical thyroidism. Reports moderate hair loss. Reports positional dizziness. Has dyspnea with exertion.   Avoiding ETOH. Following a low sugar/carb diet.     Family history: Father-colon cancer, Sister-thyroid cancer. Maternal grandfather - esophageal cancer, maternal grandmother lung cancer     Past Medical History: 
coping well with his/her disease and has excellent support.    I appreciate the opportunity to participate in Ms. Mica Roberto's care.    Signed By: LAUREN Griffiths - NP     May 29, 2024

## 2024-06-03 DIAGNOSIS — E53.8 VITAMIN B12 DEFICIENCY: ICD-10-CM

## 2024-06-05 LAB — METHYLMALONATE SERPL-SCNC: 187 NMOL/L (ref 0–378)

## 2024-06-27 ENCOUNTER — OFFICE VISIT (OUTPATIENT)
Facility: CLINIC | Age: 44
End: 2024-06-27
Payer: COMMERCIAL

## 2024-06-27 VITALS
HEIGHT: 66 IN | HEART RATE: 80 BPM | DIASTOLIC BLOOD PRESSURE: 78 MMHG | SYSTOLIC BLOOD PRESSURE: 114 MMHG | OXYGEN SATURATION: 96 % | BODY MASS INDEX: 36.48 KG/M2 | TEMPERATURE: 99.1 F

## 2024-06-27 DIAGNOSIS — M79.642 BILATERAL HAND PAIN: Primary | ICD-10-CM

## 2024-06-27 DIAGNOSIS — R20.0 NUMBNESS AND TINGLING IN BOTH HANDS: ICD-10-CM

## 2024-06-27 DIAGNOSIS — R20.2 NUMBNESS AND TINGLING IN BOTH HANDS: ICD-10-CM

## 2024-06-27 DIAGNOSIS — R21 RASH AND NONSPECIFIC SKIN ERUPTION: ICD-10-CM

## 2024-06-27 DIAGNOSIS — M79.641 BILATERAL HAND PAIN: Primary | ICD-10-CM

## 2024-06-27 PROCEDURE — 99214 OFFICE O/P EST MOD 30 MIN: CPT | Performed by: STUDENT IN AN ORGANIZED HEALTH CARE EDUCATION/TRAINING PROGRAM

## 2024-06-27 RX ORDER — TRIAMCINOLONE ACETONIDE 1 MG/G
CREAM TOPICAL
Qty: 45 G | Refills: 0 | Status: SHIPPED | OUTPATIENT
Start: 2024-06-27

## 2024-06-27 RX ORDER — NORETHINDRONE AND ETHINYL ESTRADIOL 1 MG-35MCG
1 KIT ORAL DAILY
COMMUNITY
Start: 2024-06-21

## 2024-06-27 RX ORDER — DICLOFENAC SODIUM 75 MG/1
75 TABLET, DELAYED RELEASE ORAL EVERY 12 HOURS PRN
Qty: 60 TABLET | Refills: 0 | Status: SHIPPED | OUTPATIENT
Start: 2024-06-27

## 2024-06-27 RX ORDER — ESKETAMINE HYDROCHLORIDE 28 MG/.2ML
SOLUTION NASAL
COMMUNITY
Start: 2024-06-19

## 2024-06-27 ASSESSMENT — PATIENT HEALTH QUESTIONNAIRE - PHQ9
SUM OF ALL RESPONSES TO PHQ9 QUESTIONS 1 & 2: 2
1. LITTLE INTEREST OR PLEASURE IN DOING THINGS: SEVERAL DAYS
SUM OF ALL RESPONSES TO PHQ QUESTIONS 1-9: 2
2. FEELING DOWN, DEPRESSED OR HOPELESS: SEVERAL DAYS
SUM OF ALL RESPONSES TO PHQ QUESTIONS 1-9: 2

## 2024-06-27 ASSESSMENT — ENCOUNTER SYMPTOMS
COUGH: 0
RHINORRHEA: 0
ABDOMINAL PAIN: 0
NAUSEA: 0
VOMITING: 0
SHORTNESS OF BREATH: 0

## 2024-06-27 NOTE — PROGRESS NOTES
Identified pt with two pt identifiers(name and ). Reviewed record in preparation for visit and have obtained necessary documentation. All patient medications has been reviewed.  Chief Complaint   Patient presents with    Hand Numbness     Mainly in the left arm and hand and right side hand. Pt noticed symptoms about 2 months ago.    Tingling     Pt states she struggles to ,hold and open objects such as forks.    Rash     Pt has a rash on her right arm, under pits and under left breast.       Vitals:    24 0954   BP: 114/78   Pulse: 80   Temp: 99.1 °F (37.3 °C)   SpO2: 96%                   Coordination of Care Questionnaire:   1) Have you been to an emergency room, urgent care, or hospitalized since your last visit?   no     2. Have seen or consulted any other health care provider since your last visit? no          
nausea and vomiting.   Musculoskeletal:         Bilateral hand pain   Skin:  Positive for rash.   Neurological:  Positive for numbness. Negative for dizziness, weakness and headaches.         Objective:     Vitals:    06/27/24 0954   BP: 114/78   Pulse: 80   Temp: 99.1 °F (37.3 °C)   SpO2: 96%          Vitals and Nurse Documentation reviewed.     Physical Exam  Constitutional:       General: She is not in acute distress.     Appearance: Normal appearance. She is obese. She is not ill-appearing.   HENT:      Head: Normocephalic and atraumatic.   Eyes:      Conjunctiva/sclera: Conjunctivae normal.   Cardiovascular:      Rate and Rhythm: Normal rate and regular rhythm.      Heart sounds: Normal heart sounds. No murmur heard.     No friction rub. No gallop.   Pulmonary:      Effort: Pulmonary effort is normal. No respiratory distress.      Breath sounds: Normal breath sounds. No wheezing, rhonchi or rales.   Abdominal:      General: Bowel sounds are normal. There is no distension.      Palpations: Abdomen is soft.      Tenderness: There is no abdominal tenderness. There is no guarding or rebound.   Musculoskeletal:      Cervical back: Neck supple.      Right lower leg: No edema.      Left lower leg: No edema.      Comments: Upper extremities: Nontender to palpation; normal range of motion of the elbows and wrists; normal strength and sensation in bilateral hands; negative Tinel's sign; radial pulse 2+ bilaterally; no skin changes noted   Skin:            Comments: Small approximately 3 cm sized circular erythematous rash noted on right inner arm consistent with dermatitis; also papular rash noted under bilateral underarms; slight erythematous rash noted under left breast   Neurological:      Mental Status: She is alert and oriented to person, place, and time.      Gait: Gait normal.       No results found for this visit on 06/27/24.

## 2024-07-11 DIAGNOSIS — E03.8 SUBCLINICAL HYPOTHYROIDISM: Primary | ICD-10-CM

## 2024-07-11 RX ORDER — LEVOTHYROXINE SODIUM 25 MCG
TABLET ORAL
Qty: 90 TABLET | Refills: 1 | Status: SHIPPED | OUTPATIENT
Start: 2024-07-11

## 2024-08-07 DIAGNOSIS — M79.641 BILATERAL HAND PAIN: ICD-10-CM

## 2024-08-07 DIAGNOSIS — M79.642 BILATERAL HAND PAIN: ICD-10-CM

## 2024-08-08 RX ORDER — DICLOFENAC SODIUM 75 MG/1
75 TABLET, DELAYED RELEASE ORAL EVERY 12 HOURS PRN
Qty: 60 TABLET | Refills: 0 | Status: SHIPPED | OUTPATIENT
Start: 2024-08-08

## 2024-08-09 ENCOUNTER — TELEPHONE (OUTPATIENT)
Age: 44
End: 2024-08-09

## 2024-08-09 NOTE — TELEPHONE ENCOUNTER
Patient called and stated that she needed to reschedule her appt on 8/22 due to her having a procedure to have a cyst removed that day.

## 2024-08-26 DIAGNOSIS — E61.1 IRON DEFICIENCY: ICD-10-CM

## 2024-08-30 LAB
BASOPHILS # BLD AUTO: 0.1 X10E3/UL (ref 0–0.2)
BASOPHILS NFR BLD AUTO: 2 %
EOSINOPHIL # BLD AUTO: 0.2 X10E3/UL (ref 0–0.4)
EOSINOPHIL NFR BLD AUTO: 3 %
ERYTHROCYTE [DISTWIDTH] IN BLOOD BY AUTOMATED COUNT: 14.3 % (ref 11.7–15.4)
FERRITIN SERPL-MCNC: 62 NG/ML (ref 15–150)
HCT VFR BLD AUTO: 39.2 % (ref 34–46.6)
HGB BLD-MCNC: 12.7 G/DL (ref 11.1–15.9)
IMM GRANULOCYTES # BLD AUTO: 0 X10E3/UL (ref 0–0.1)
IMM GRANULOCYTES NFR BLD AUTO: 0 %
IRON SATN MFR SERPL: 11 % (ref 15–55)
IRON SERPL-MCNC: 46 UG/DL (ref 27–159)
LYMPHOCYTES # BLD AUTO: 2.1 X10E3/UL (ref 0.7–3.1)
LYMPHOCYTES NFR BLD AUTO: 31 %
MCH RBC QN AUTO: 27.6 PG (ref 26.6–33)
MCHC RBC AUTO-ENTMCNC: 32.4 G/DL (ref 31.5–35.7)
MCV RBC AUTO: 85 FL (ref 79–97)
MONOCYTES # BLD AUTO: 0.4 X10E3/UL (ref 0.1–0.9)
MONOCYTES NFR BLD AUTO: 5 %
NEUTROPHILS # BLD AUTO: 4 X10E3/UL (ref 1.4–7)
NEUTROPHILS NFR BLD AUTO: 59 %
PLATELET # BLD AUTO: 313 X10E3/UL (ref 150–450)
RBC # BLD AUTO: 4.6 X10E6/UL (ref 3.77–5.28)
TIBC SERPL-MCNC: 403 UG/DL (ref 250–450)
UIBC SERPL-MCNC: 357 UG/DL (ref 131–425)
WBC # BLD AUTO: 6.8 X10E3/UL (ref 3.4–10.8)

## 2024-09-02 NOTE — PROGRESS NOTES
[facility]  Medical Oncology at [place]  [phone]    Hematology / Oncology Established Visit    Reason for Visit:   Mica Roberto is a 44 y.o. female who is seen for anemia.     History of Present Illness:   Mica Roberto is a 44 y.o. female who is seen for evaluation of anemia. Medical history significant for subclinical hypothyroidism, hair loss, fatigue, depression and HODGE/MASH. Denies history of a gastric bypass. Reports in 2019 she was following with a hematologist for anemia at [facility] in [place]. Reports her ferritin was 5 at the time and she received parenteral iron infusions. Had a normal colonoscopy May 2023 by Dr. Flaquito Roberts. Also had EGD and gastric emptying study. On OCPs and denies any menstruation since 1998. Reports she is participating in an observational clinical trial for her liver disease: metabolic dysfunction-associated steatohepatitis (MASH) with fibrosis stage 1. Following with Dr. Kan for the clinical trial.     Interval History:  Not on oral iron supplements due to nausea, GI upset. Started oral B12 supplements in 6/2024. No blood in stools. States her energy level has improved from prior. She attributes this to discontinuing Propranolol.       Family history: Father-colon cancer, Sister-thyroid cancer. Maternal grandfather - esophageal cancer, maternal grandmother lung cancer     Past Medical History:   Diagnosis Date    Anxiety     Asthma     Depression     Gastroparesis     HODGE (nonalcoholic steatohepatitis)     Neuromuscular disorder (HCC)     Thyroid disease Subclinical hypothyroidism      Past Surgical History:   Procedure Laterality Date    ABDOMINAL EXPLORATION SURGERY      CHOLECYSTECTOMY  Oct 1998    COLONOSCOPY      EYE SURGERY  Lasik  5/16/12    FRACTURE SURGERY  10/2001 - implant hardware Rt metatarsal    LEEP      LIVER BIOPSY  02/20/2024    UPPER GASTROINTESTINAL ENDOSCOPY  8/9/23      Social History     Tobacco Use

## 2024-09-03 ENCOUNTER — OFFICE VISIT (OUTPATIENT)
Age: 44
End: 2024-09-03
Payer: COMMERCIAL

## 2024-09-03 VITALS
RESPIRATION RATE: 16 BRPM | HEIGHT: 66 IN | BODY MASS INDEX: 36.48 KG/M2 | TEMPERATURE: 97.6 F | SYSTOLIC BLOOD PRESSURE: 107 MMHG | DIASTOLIC BLOOD PRESSURE: 77 MMHG | OXYGEN SATURATION: 96 % | HEART RATE: 101 BPM

## 2024-09-03 DIAGNOSIS — E53.8 VITAMIN B12 DEFICIENCY: Primary | ICD-10-CM

## 2024-09-03 DIAGNOSIS — K75.81 METABOLIC DYSFUNCTION-ASSOCIATED STEATOHEPATITIS (MASH): ICD-10-CM

## 2024-09-03 DIAGNOSIS — E61.1 IRON DEFICIENCY: ICD-10-CM

## 2024-09-03 DIAGNOSIS — R53.83 OTHER FATIGUE: ICD-10-CM

## 2024-09-03 PROCEDURE — 99213 OFFICE O/P EST LOW 20 MIN: CPT | Performed by: INTERNAL MEDICINE

## 2024-09-03 ASSESSMENT — PATIENT HEALTH QUESTIONNAIRE - PHQ9
SUM OF ALL RESPONSES TO PHQ QUESTIONS 1-9: 0
SUM OF ALL RESPONSES TO PHQ9 QUESTIONS 1 & 2: 0
2. FEELING DOWN, DEPRESSED OR HOPELESS: NOT AT ALL
1. LITTLE INTEREST OR PLEASURE IN DOING THINGS: NOT AT ALL
SUM OF ALL RESPONSES TO PHQ QUESTIONS 1-9: 0

## 2024-09-03 NOTE — PROGRESS NOTES
Chief Complaint   Patient presents with    Follow-up           Vitals:    09/03/24 0925   BP: 107/77   Pulse: (!) 101   Resp: 16   Temp: 97.6 °F (36.4 °C)   SpO2: 96%            1. Have you been to the ER, urgent care clinic since your last visit?  Hospitalized since your last visit?  Yes urgent care, sinus infection  2. Have you seen or consulted any other health care providers outside of the [facility] since your last visit?  Include any pap smears or colon screening. Yes, Hepatologist, psychiatrist,Allergist, PCP

## 2024-09-16 DIAGNOSIS — M79.642 BILATERAL HAND PAIN: ICD-10-CM

## 2024-09-16 DIAGNOSIS — M79.641 BILATERAL HAND PAIN: ICD-10-CM

## 2024-09-16 RX ORDER — DICLOFENAC SODIUM 75 MG/1
75 TABLET, DELAYED RELEASE ORAL EVERY 12 HOURS PRN
Qty: 60 TABLET | Refills: 0 | Status: SHIPPED | OUTPATIENT
Start: 2024-09-16

## 2024-10-11 DIAGNOSIS — M79.642 BILATERAL HAND PAIN: ICD-10-CM

## 2024-10-11 DIAGNOSIS — M79.641 BILATERAL HAND PAIN: ICD-10-CM

## 2024-10-11 RX ORDER — DICLOFENAC SODIUM 75 MG/1
75 TABLET, DELAYED RELEASE ORAL EVERY 12 HOURS PRN
Qty: 60 TABLET | Refills: 0 | Status: SHIPPED | OUTPATIENT
Start: 2024-10-11

## 2024-10-25 DIAGNOSIS — E03.8 SUBCLINICAL HYPOTHYROIDISM: ICD-10-CM

## 2024-10-28 RX ORDER — LEVOTHYROXINE SODIUM 25 UG/1
25 TABLET ORAL
Qty: 90 TABLET | Refills: 1 | Status: SHIPPED | OUTPATIENT
Start: 2024-10-28

## 2024-11-07 DIAGNOSIS — M79.642 BILATERAL HAND PAIN: ICD-10-CM

## 2024-11-07 DIAGNOSIS — M79.641 BILATERAL HAND PAIN: ICD-10-CM

## 2024-11-08 RX ORDER — DICLOFENAC SODIUM 75 MG/1
75 TABLET, DELAYED RELEASE ORAL EVERY 12 HOURS PRN
Qty: 60 TABLET | Refills: 0 | OUTPATIENT
Start: 2024-11-08